# Patient Record
Sex: MALE | Race: BLACK OR AFRICAN AMERICAN | URBAN - METROPOLITAN AREA
[De-identification: names, ages, dates, MRNs, and addresses within clinical notes are randomized per-mention and may not be internally consistent; named-entity substitution may affect disease eponyms.]

---

## 2017-01-05 RX ORDER — PRAVASTATIN SODIUM 40 MG/1
TABLET ORAL
Qty: 90 TAB | Refills: 3 | Status: SHIPPED | OUTPATIENT
Start: 2017-01-05 | End: 2017-12-05 | Stop reason: SDUPTHER

## 2017-01-05 RX ORDER — HYDROCHLOROTHIAZIDE 12.5 MG/1
CAPSULE ORAL
Qty: 90 CAP | Refills: 3 | Status: SHIPPED | OUTPATIENT
Start: 2017-01-05 | End: 2017-12-05 | Stop reason: SDUPTHER

## 2017-03-09 ENCOUNTER — HOSPITAL ENCOUNTER (OUTPATIENT)
Dept: LAB | Age: 72
Discharge: HOME | End: 2017-03-09
Payer: MEDICARE

## 2017-03-09 ENCOUNTER — OFFICE VISIT (OUTPATIENT)
Dept: FAMILY MEDICINE CLINIC | Age: 72
End: 2017-03-09

## 2017-03-09 VITALS
WEIGHT: 179 LBS | HEART RATE: 86 BPM | DIASTOLIC BLOOD PRESSURE: 80 MMHG | OXYGEN SATURATION: 98 % | HEIGHT: 65 IN | BODY MASS INDEX: 29.82 KG/M2 | SYSTOLIC BLOOD PRESSURE: 128 MMHG | RESPIRATION RATE: 16 BRPM | TEMPERATURE: 97.6 F

## 2017-03-09 DIAGNOSIS — E11.9 DIABETES MELLITUS WITHOUT COMPLICATION (HCC): ICD-10-CM

## 2017-03-09 DIAGNOSIS — E78.00 HYPERCHOLESTEROLEMIA: ICD-10-CM

## 2017-03-09 DIAGNOSIS — I10 HTN (HYPERTENSION), BENIGN: ICD-10-CM

## 2017-03-09 DIAGNOSIS — E11.9 DIABETES MELLITUS WITHOUT COMPLICATION (HCC): Primary | ICD-10-CM

## 2017-03-09 LAB
ALT SERPL-CCNC: 56 U/L (ref 16–61)
ANION GAP BLD CALC-SCNC: 9 MMOL/L (ref 3–18)
AST SERPL W P-5'-P-CCNC: 26 U/L (ref 15–37)
BUN SERPL-MCNC: 19 MG/DL (ref 7–18)
BUN/CREAT SERPL: 19 (ref 12–20)
CALCIUM SERPL-MCNC: 10 MG/DL (ref 8.5–10.1)
CHLORIDE SERPL-SCNC: 104 MMOL/L (ref 100–108)
CHOLEST SERPL-MCNC: 159 MG/DL
CO2 SERPL-SCNC: 27 MMOL/L (ref 21–32)
CREAT SERPL-MCNC: 1.01 MG/DL (ref 0.6–1.3)
EST. AVERAGE GLUCOSE BLD GHB EST-MCNC: 148 MG/DL
GLUCOSE SERPL-MCNC: 120 MG/DL (ref 74–99)
HBA1C MFR BLD: 6.8 % (ref 4.2–5.6)
HDLC SERPL-MCNC: 53 MG/DL (ref 40–60)
HDLC SERPL: 3 {RATIO} (ref 0–5)
LDLC SERPL CALC-MCNC: 87.8 MG/DL (ref 0–100)
LIPID PROFILE,FLP: NORMAL
POTASSIUM SERPL-SCNC: 4.3 MMOL/L (ref 3.5–5.5)
SODIUM SERPL-SCNC: 140 MMOL/L (ref 136–145)
TRIGL SERPL-MCNC: 91 MG/DL (ref ?–150)
VLDLC SERPL CALC-MCNC: 18.2 MG/DL

## 2017-03-09 PROCEDURE — 80048 BASIC METABOLIC PNL TOTAL CA: CPT | Performed by: FAMILY MEDICINE

## 2017-03-09 PROCEDURE — 84450 TRANSFERASE (AST) (SGOT): CPT | Performed by: FAMILY MEDICINE

## 2017-03-09 PROCEDURE — 83036 HEMOGLOBIN GLYCOSYLATED A1C: CPT | Performed by: FAMILY MEDICINE

## 2017-03-09 PROCEDURE — 84460 ALANINE AMINO (ALT) (SGPT): CPT | Performed by: FAMILY MEDICINE

## 2017-03-09 PROCEDURE — 36415 COLL VENOUS BLD VENIPUNCTURE: CPT | Performed by: FAMILY MEDICINE

## 2017-03-09 PROCEDURE — 80061 LIPID PANEL: CPT | Performed by: FAMILY MEDICINE

## 2017-03-09 RX ORDER — IRBESARTAN 300 MG/1
TABLET ORAL
Qty: 90 TAB | Refills: 3 | Status: SHIPPED | OUTPATIENT
Start: 2017-03-09 | End: 2017-12-05 | Stop reason: SDUPTHER

## 2017-03-09 NOTE — PROGRESS NOTES
1. Have you been to the ER, urgent care clinic since your last visit? Hospitalized since your last visit?no    2. Have you seen or consulted any other health care providers outside of the [address] [place] [address] since your last visit? Include any pap smears or colon screening.  No

## 2017-03-09 NOTE — MR AVS SNAPSHOT
Visit Information Date & Time Provider Department Dept. Phone Encounter #  
 3/9/2017 11:00 AM Jose ElizondoKaran [id number] Follow-up Instructions Return in about 4 months (around 7/9/2017) for medicare well. Upcoming Health Maintenance Date Due Pneumococcal 65+ Low/Medium Risk (1 of 2 - PCV13) 6/22/2017* EYE EXAM RETINAL OR DILATED Q1 6/22/2017* DTaP/Tdap/Td series (1 - Tdap) 6/23/2017* MEDICARE YEARLY EXAM 7/19/2017* FOOT EXAM Q1 4/19/2017 MICROALBUMIN Q1 4/19/2017 LIPID PANEL Q1 8/30/2017 HEMOGLOBIN A1C Q6M 9/9/2017 GLAUCOMA SCREENING Q2Y 11/4/2017 COLONOSCOPY 4/1/2025 *Topic was postponed. The date shown is not the original due date. Allergies as of 3/9/2017  Review Complete On: 3/9/2017 By: Jose Elizondo MD  
 No Known Allergies Current Immunizations  Never Reviewed No immunizations on file. Not reviewed this visit You Were Diagnosed With   
  
 Codes Comments Diabetes mellitus without [facility]    -  Primary ICD-10-CM: E11.9 ICD-9-CM: 250.00 Hypercholesterolemia     ICD-10-CM: E78.00 ICD-9-CM: 272.0   
 HTN (hypertension), benign     ICD-10-CM: I10 
ICD-9-CM: 401.1 Vitals BP Pulse Temp Resp Height(growth percentile) Weight(growth percentile) 128/80 (BP 1 Location: Left arm, BP Patient Position: Sitting) 86 97.6 °F (36.4 °C) (Oral) 16 5' 5\" (1.651 m) 179 lb (81.2 kg) SpO2 BMI Smoking Status 98% 29.79 kg/m2 Current Every Day Smoker BMI and BSA Data Body Mass Index Body Surface Area  
 29.79 kg/m 2 1.93 m 2 Preferred Pharmacy Pharmacy Name Phone [id number], [address] [place] [address],4Th Floor [facility] [phone] Your Updated Medication List  
  
   
This list is accurate as of: 3/9/17 12:03 PM.  Always use your most recent med list.  
  
  
  
  
 hydroCHLOROthiazide 12.5 mg capsule Commonly known as:  Norval [place] TAKE 1 CAPSULE BY MOUTH DAILY  
  
 irbesartan 300 mg tablet Commonly known as:  AVAPRO TAKE 1 TABLET BY MOUTH DAILY. MULTI-VITAMIN PO Take  by mouth.  
  
 pravastatin 40 mg tablet Commonly known as:  PRAVACHOL  
TAKE 1 TABLET BY MOUTH DAILY Prescriptions Sent to Pharmacy Refills  
 irbesartan (AVAPRO) 300 mg tablet 3 Sig: TAKE 1 TABLET BY MOUTH DAILY. Class: Normal  
 [id number],4Th Floor [address] #: [phone] Follow-up Instructions Return in about 4 months (around 7/9/2017) for medicare well. Patient Instructions Diabetes Foot Health: Care Instructions Your Care Instructions When you have diabetes, your feet need extra care and attention. Diabetes can damage the nerve endings and blood vessels in your feet, making you less likely to notice when your feet are injured. Diabetes also limits your body's ability to fight infection and get blood to areas that need it. If you get a minor foot injury, it could become an ulcer or a serious infection. With good foot care, you can prevent most of these problems. Caring for your feet can be quick and easy. Most of the care can be done when you are bathing or getting ready for bed. Follow-up care is a key part of your treatment and safety. Be sure to make and go to all appointments, and call your doctor if you are having problems. Its also a good idea to know your test results and keep a list of the medicines you take. How can you care for yourself at home? · Keep your blood sugar close to normal by watching what and how much you eat, monitoring blood sugar, taking medicines if prescribed, and getting regular exercise. · Do not smoke. Smoking affects blood flow and can make foot problems worse.  If you need help quitting, talk to your doctor about stop-smoking programs and medicines. These can increase your chances of quitting for good. · Eat a diet that is low in fats. High fat intake can cause fat to build up in your blood vessels and decrease blood flow. · Inspect your feet daily for blisters, cuts, cracks, or sores. If you cannot see well, use a mirror or have someone help you. · Take care of your feet: 
[facility] your feet every day. Use warm (not hot) water. Check the water temperature with your wrists or other part of your body, not your feet. ¨ Dry your feet well. Pat them dry. Do not rub the skin on your feet too hard. Dry well between your toes. If the skin on your feet stays moist, bacteria or a fungus can grow, which can lead to infection. ¨ Keep your skin soft. Use moisturizing skin cream to keep the skin on your feet soft and prevent calluses and cracks. But do not put the cream between your toes, and stop using any cream that causes a rash. ¨ Clean underneath your toenails carefully. Do not use a sharp object to clean underneath your toenails. Use the blunt end of a nail file or other rounded tool. ¨ Trim and file your toenails straight across to prevent ingrown toenails. Use a nail clipper, not scissors. Use an emery board to smooth the edges. · Change socks daily. Socks without seams are best, because seams often rub the feet. You can find socks for people with diabetes from specialty catalogs. · Look inside your shoes every day for things like gravel or torn linings, which could cause blisters or sores. · Buy shoes that fit well: 
¨ Look for shoes that have plenty of space around the toes. This helps prevent bunions and blisters. ¨ Try on shoes while wearing the kind of socks you will usually wear with the shoes. ¨ Avoid plastic shoes. They may rub your feet and cause blisters. Good shoes should be made of materials that are flexible and breathable, such as leather or cloth. ¨ Break in new shoes slowly by wearing them for no more than an hour a day for several days. Take extra time to check your feet for red areas, blisters, or other problems after you wear new shoes. · Do not go barefoot. Do not wear sandals, and do not wear shoes with very thin soles. Thin soles are easy to puncture. They also do not protect your feet from hot pavement or cold weather. · Have your doctor check your feet during each visit. If you have a foot problem, see your doctor. Do not try to treat an early foot problem at home. Home remedies or treatments that you can buy without a prescription (such as corn removers) can be harmful. · Always get early treatment for foot problems. A minor irritation can lead to a major problem if not properly cared for early. When should you call for help? Call your doctor now or seek immediate medical care if: 
· You have a foot sore, an ulcer or break in the skin that is not healing after 4 days, bleeding corns or calluses, or an ingrown toenail. · You have blue or black areas, which can mean bruising or blood flow problems. · You have peeling skin or tiny blisters between your toes or cracking or oozing of the skin. · You have a fever for more than 24 hours and a foot sore. · You have new numbness or tingling in your feet that does not go away after you move your feet or change positions. · You have unexplained or unusual swelling of the foot or ankle. Watch closely for changes in your health, and be sure to contact your doctor if: 
· You cannot do proper foot care. Where can you learn more? Go to http://carleen-harjinder.info/. Enter A739 in the search box to learn more about \"Diabetes Foot Health: Care Instructions. \" Current as of: May 23, 2016 Content Version: 11.1 © 8461-1534 [employer], [employer].  Care instructions adapted under license by [employer] (which disclaims liability or warranty for this information). If you have questions about a medical condition or this instruction, always ask your healthcare professional. [address] any warranty or liability for your use of this information. Introducing [facility] SERVICES! [employer] introduces [employer] patient portal. Now you can access parts of your medical record, email your doctor's office, and request medication refills online. 1. In your internet browser, go to [email or web address] 2. Click on the First Time User? Click Here link in the Sign In box. You will see the New Member Sign Up page. 3. Enter your [employer] Access Code exactly as it appears below. You will not need to use this code after youve completed the sign-up process. If you do not sign up before the expiration date, you must request a new code. · [employer] Access Code: [email or web address] Expires: 6/7/2017 12:01 PM 
 
4. Enter the last four digits of your Social Security Number (xxxx) and Date of Birth (mm/dd/yyyy) as indicated and click Submit. You will be taken to the next sign-up page. 5. Create a [employer] ID. This will be your [employer] login ID and cannot be changed, so think of one that is secure and easy to remember. 6. Create a [employer] password. You can change your password at any time. 7. Enter your Password Reset Question and Answer. This can be used at a later time if you forget your password. 8. Enter your e-mail address. You will receive e-mail notification when new information is available in [address] 9. Click Sign Up. You can now view and download portions of your medical record. 10. Click the Download Summary menu link to download a portable copy of your medical information. If you have questions, please visit the Frequently Asked Questions section of the [employer] website. Remember, [employer] is NOT to be used for urgent needs. For medical emergencies, dial 911. Now available from your iPhone and Android! Please provide this summary of care documentation to your next provider. Your primary care clinician is listed as [facility]. If you have any questions after today's visit, please call [phone].

## 2017-03-09 NOTE — PROGRESS NOTES
HISTORY OF PRESENT ILLNESS  Torito Santana is a 66 y.o. male. HPI  Patient is here today for evaluation and treatment of: Hypertension/Cholesterol problem    HTN: Patient is compliant with medication and does not monitor blood pressure readings at home. Patient  does work on diet and exercise. [facility] CHOL: Patient is compliant with medication and is fasting today. Diet reported above. Continues on pravachol    Pt also has DM that is diet controlled. Last A1c was controlled. He has no new complaints today      Current Outpatient Prescriptions:     irbesartan (AVAPRO) 300 mg tablet, TAKE 1 TABLET BY MOUTH DAILY. , Disp: 90 Tab, Rfl: 3    pravastatin (PRAVACHOL) 40 mg tablet, TAKE 1 TABLET BY MOUTH DAILY, Disp: 90 Tab, Rfl: 3    hydroCHLOROthiazide (MICROZIDE) 12.5 mg capsule, TAKE 1 CAPSULE BY MOUTH DAILY, Disp: 90 Cap, Rfl: 3    MULTIVITAMINS (MULTI-VITAMIN PO), Take  by mouth., Disp: , Rfl:       PMH,  Meds, Allergies, Family History, Social history reviewed      Review of Systems   Constitutional: Negative for weight loss. Cardiovascular: Negative for chest pain and leg swelling.        Physical Exam   Visit Vitals    /80 (BP 1 Location: Left arm, BP Patient Position: Sitting)    Pulse 86    Temp 97.6 °F (36.4 °C) (Oral)    Resp 16    Ht 5' 5\" (1.651 m)    Wt 179 lb (81.2 kg)    SpO2 98%    BMI 29.79 kg/m2   General appearance: alert, cooperative, no distress, appears stated age  Neck: supple, symmetrical, trachea midline, no adenopathy, thyroid: not enlarged, symmetric, no tenderness/mass/nodules, no carotid bruit and no JVD  Lungs: clear to auscultation bilaterally  Heart: regular rate and rhythm, S1, S2 normal, no murmur, click, rub or gallop  Extremities: extremities normal, atraumatic, no cyanosis or edema    Lab Results   Component Value Date/Time    Cholesterol, total 133 08/30/2016 11:35 AM    HDL Cholesterol 50 08/30/2016 11:35 AM    LDL, calculated 70.2 08/30/2016 11:35 AM    VLDL, calculated 12.8 08/30/2016 11:35 AM    Triglyceride 64 08/30/2016 11:35 AM    CHOL/HDL Ratio 2.7 08/30/2016 11:35 AM     Lab Results   Component Value Date/Time    Glucose 120 08/30/2016 11:35 AM     Lab Results   Component Value Date/Time    Hemoglobin A1c 6.7 08/30/2016 11:35 AM         ASSESSMENT and PLAN    ICD-10-CM ICD-9-CM    1. Diabetes mellitus without complication (HCC) K49.5 250.00    2. Hypercholesterolemia E78.00 272.0    3. HTN (hypertension), benign I10 401.1      As above,   above all stable unless otherwise noted  Labs as ordered  Continue current meds as ordered  Follow-up Disposition:  Return in about 4 months (around 7/9/2017) for medicare well. An After Visit Summary was printed and given to the patient. This has been fully explained to the patient, who indicates understanding.

## 2017-03-09 NOTE — PATIENT INSTRUCTIONS
Diabetes Foot Health: Care Instructions  Your Care Instructions    When you have diabetes, your feet need extra care and attention. Diabetes can damage the nerve endings and blood vessels in your feet, making you less likely to notice when your feet are injured. Diabetes also limits your body's ability to fight infection and get blood to areas that need it. If you get a minor foot injury, it could become an ulcer or a serious infection. With good foot care, you can prevent most of these problems. Caring for your feet can be quick and easy. Most of the care can be done when you are bathing or getting ready for bed. Follow-up care is a key part of your treatment and safety. Be sure to make and go to all appointments, and call your doctor if you are having problems. Its also a good idea to know your test results and keep a list of the medicines you take. How can you care for yourself at home? · Keep your blood sugar close to normal by watching what and how much you eat, monitoring blood sugar, taking medicines if prescribed, and getting regular exercise. · Do not smoke. Smoking affects blood flow and can make foot problems worse. If you need help quitting, talk to your doctor about stop-smoking programs and medicines. These can increase your chances of quitting for good. · Eat a diet that is low in fats. High fat intake can cause fat to build up in your blood vessels and decrease blood flow. · Inspect your feet daily for blisters, cuts, cracks, or sores. If you cannot see well, use a mirror or have someone help you. · Take care of your feet:  [facility] your feet every day. Use warm (not hot) water. Check the water temperature with your wrists or other part of your body, not your feet. ¨ Dry your feet well. Pat them dry. Do not rub the skin on your feet too hard. Dry well between your toes. If the skin on your feet stays moist, bacteria or a fungus can grow, which can lead to infection.   ¨ Keep your skin soft. Use moisturizing skin cream to keep the skin on your feet soft and prevent calluses and cracks. But do not put the cream between your toes, and stop using any cream that causes a rash. ¨ Clean underneath your toenails carefully. Do not use a sharp object to clean underneath your toenails. Use the blunt end of a nail file or other rounded tool. ¨ Trim and file your toenails straight across to prevent ingrown toenails. Use a nail clipper, not scissors. Use an emery board to smooth the edges. · Change socks daily. Socks without seams are best, because seams often rub the feet. You can find socks for people with diabetes from specialty catalogs. · Look inside your shoes every day for things like gravel or torn linings, which could cause blisters or sores. · Buy shoes that fit well:  ¨ Look for shoes that have plenty of space around the toes. This helps prevent bunions and blisters. ¨ Try on shoes while wearing the kind of socks you will usually wear with the shoes. ¨ Avoid plastic shoes. They may rub your feet and cause blisters. Good shoes should be made of materials that are flexible and breathable, such as leather or cloth. ¨ Break in new shoes slowly by wearing them for no more than an hour a day for several days. Take extra time to check your feet for red areas, blisters, or other problems after you wear new shoes. · Do not go barefoot. Do not wear sandals, and do not wear shoes with very thin soles. Thin soles are easy to puncture. They also do not protect your feet from hot pavement or cold weather. · Have your doctor check your feet during each visit. If you have a foot problem, see your doctor. Do not try to treat an early foot problem at home. Home remedies or treatments that you can buy without a prescription (such as corn removers) can be harmful. · Always get early treatment for foot problems. A minor irritation can lead to a major problem if not properly cared for early.   When should you call for help? Call your doctor now or seek immediate medical care if:  · You have a foot sore, an ulcer or break in the skin that is not healing after 4 days, bleeding corns or calluses, or an ingrown toenail. · You have blue or black areas, which can mean bruising or blood flow problems. · You have peeling skin or tiny blisters between your toes or cracking or oozing of the skin. · You have a fever for more than 24 hours and a foot sore. · You have new numbness or tingling in your feet that does not go away after you move your feet or change positions. · You have unexplained or unusual swelling of the foot or ankle. Watch closely for changes in your health, and be sure to contact your doctor if:  · You cannot do proper foot care. Where can you learn more? Go to http://carleen-harjinder.info/. Enter A739 in the search box to learn more about \"Diabetes Foot Health: Care Instructions. \"  Current as of: May 23, 2016  Content Version: 11.1  © 3942-0490 [employer]. Care instructions adapted under license by [employer] (which disclaims liability or warranty for this information). If you have questions about a medical condition or this instruction, always ask your healthcare professional. [address] any warranty or liability for your use of this information.

## 2017-07-12 ENCOUNTER — OFFICE VISIT (OUTPATIENT)
Dept: FAMILY MEDICINE CLINIC | Age: 72
End: 2017-07-12

## 2017-07-12 VITALS
HEART RATE: 79 BPM | RESPIRATION RATE: 16 BRPM | BODY MASS INDEX: 30.32 KG/M2 | DIASTOLIC BLOOD PRESSURE: 64 MMHG | HEIGHT: 65 IN | OXYGEN SATURATION: 98 % | TEMPERATURE: 97.7 F | SYSTOLIC BLOOD PRESSURE: 124 MMHG | WEIGHT: 182 LBS

## 2017-07-12 DIAGNOSIS — Z00.00 ROUTINE GENERAL MEDICAL EXAMINATION AT A HEALTH CARE FACILITY: Primary | ICD-10-CM

## 2017-07-12 DIAGNOSIS — E11.9 DIABETES MELLITUS WITHOUT COMPLICATION (HCC): ICD-10-CM

## 2017-07-12 NOTE — PROGRESS NOTES
This is a Subsequent Medicare Annual Wellness Visit providing Personalized Prevention Plan Services (PPPS) (Performed 12 months after initial AWV and PPPS )    I have reviewed the patient's medical history in detail and updated the computerized patient record. Pt needs a right diabetic foot exam and urine microalbumin;   Pt declines N4H today for ubcertain reason; Last A1c was 6.8% ; advised to check this again while here; he declines. History     Past Medical History:   Diagnosis Date    Arthritis     hands    Cataract     left eye    Diabetes ([facility]     diet controlled    DM (diabetes mellitus) ([facility] 2/3/2010    HTN (hypertension), benign     Hypercholesterolemia     [facility]      Past Surgical History:   Procedure Laterality Date    CARDIAC SURG PROCEDURE UNLIST  1995    left fem/pop bypass    CARDIAC SURG PROCEDURE UNLIST  1997    occlusion bypass angiographic thrombolysis failed    CARDIAC SURG PROCEDURE UNLIST      s/p left illiac/pop    HX AMPUTATION      left leg aka prosthesis due to PVD     Current Outpatient Prescriptions   Medication Sig Dispense Refill    irbesartan (AVAPRO) 300 mg tablet TAKE 1 TABLET BY MOUTH DAILY. 90 Tab 3    pravastatin (PRAVACHOL) 40 mg tablet TAKE 1 TABLET BY MOUTH DAILY 90 Tab 3    hydroCHLOROthiazide (MICROZIDE) 12.5 mg capsule TAKE 1 CAPSULE BY MOUTH DAILY 90 Cap 3    MULTIVITAMINS (MULTI-VITAMIN PO) Take  by mouth.        No Known Allergies  Family History   Problem Relation Age of Onset    Hypertension Father      Social History   Substance Use Topics    Smoking status: Current Every Day Smoker     Types: Cigarettes    Smokeless tobacco: Never Used    Alcohol use No     Patient Active Problem List   Diagnosis Code    Gout M10.9    PVD (peripheral vascular disease) (HCC) I73.9    Hypercholesterolemia E78.00    Arthritis M19.90    Cataract H26.9    DM (diabetes mellitus) ([facility] E11.9    HTN (hypertension), benign I10    Advanced directives, counseling/discussion Z71.89       Depression Risk Factor Screening:     PHQ over the last two weeks 3/9/2017   Little interest or pleasure in doing things Not at all   Feeling down, depressed or hopeless Not at all   Total Score PHQ 2 0     Alcohol Risk Factor Screening: On any occasion during the past 3 months, have you had more than 4 drinks containing alcohol? No    Do you average more than 14 drinks per week? No        Functional Ability and Level of Safety:     Hearing Loss   none    Activities of Daily Living   Self-care. Requires assistance with: no ADLs    Fall Risk   Fall Risk Assessment, last 12 mths 7/12/2017   Able to walk? Yes   Fall in past 12 months? No     Abuse Screen   Patient is not abused    Review of Systems   A comprehensive review of systems was negative except for that written in the HPI. Physical Examination     Evaluation of Cognitive Function:  Mood/affect:  neutral  Appearance: age appropriate  Family member/caregiver input: none:      Visit Vitals    /64 (BP 1 Location: Left arm, BP Patient Position: Sitting)    Pulse 79    Temp 97.7 °F (36.5 °C) (Oral)    Resp 16    Ht 5' 5\" (1.651 m)    Wt 182 lb (82.6 kg)    SpO2 98%    BMI 30.29 kg/m2     General appearance: alert, cooperative, no distress, appears stated age  Lungs: clear to auscultation bilaterally  Heart: regular rate and rhythm, S1, S2 normal, no murmur, click, rub or gallop  Extremities: extremities normal, atraumatic, no cyanosis or edema  Sensory exam of the foot is normal, tested with the monofilament. Good pulses, no lesions or ulcers, good peripheral pulses.             Patient Care Team:  Radha Lama MD as PCP - Oral Becker MD (Podiatry)  Roselia Batres MD (Ophthalmology)    Advice/Referrals/Counseling   Education and counseling provided:  Are appropriate based on today's review and evaluation  End-of-Life planning (with patient's consent)      Assessment/Plan       ICD-10-CM ICD-9-CM    1. Routine general medical examination at a health care facility Z00.00 V70.0    2. Diabetes mellitus without complication ([facility]- for foot exam only E11.9 250.00  DIABETES FOOT EXAM   .  Pt is well ; stable  Declines some HM topics ; will get at a subsequent visit. Follow-up Disposition:  Return in about 4 months (around 11/12/2017) for dm/. An After Visit Summary was printed and given to the patient. This has been fully explained to the patient, who indicates understanding.

## 2017-07-12 NOTE — PATIENT INSTRUCTIONS

## 2017-07-12 NOTE — PROGRESS NOTES
1. Have you been to the ER, urgent care clinic since your last visit? Hospitalized since your last visit? No    2. Have you seen or consulted any other health care providers outside of the [address] [place] [address] since your last visit? Include any pap smears or colon screening.  Yes Where: ophthalmology - [facility] - Dr. Rui Browning

## 2017-07-24 ENCOUNTER — TELEPHONE (OUTPATIENT)
Dept: FAMILY MEDICINE CLINIC | Age: 72
End: 2017-07-24

## 2017-07-24 NOTE — TELEPHONE ENCOUNTER
Simi checking in on a medical necessity form faxed over on 7/18/17. Confirmed she will re-fax paperwork.

## 2017-12-05 ENCOUNTER — OFFICE VISIT (OUTPATIENT)
Dept: FAMILY MEDICINE CLINIC | Age: 72
End: 2017-12-05

## 2017-12-05 VITALS
DIASTOLIC BLOOD PRESSURE: 53 MMHG | HEART RATE: 87 BPM | TEMPERATURE: 98.1 F | HEIGHT: 65 IN | WEIGHT: 187 LBS | SYSTOLIC BLOOD PRESSURE: 128 MMHG | OXYGEN SATURATION: 96 % | BODY MASS INDEX: 31.16 KG/M2 | RESPIRATION RATE: 22 BRPM

## 2017-12-05 DIAGNOSIS — I73.9 PVD (PERIPHERAL VASCULAR DISEASE) (HCC): ICD-10-CM

## 2017-12-05 DIAGNOSIS — E11.59 TYPE 2 DIABETES MELLITUS WITH OTHER CIRCULATORY COMPLICATION, WITHOUT LONG-TERM CURRENT USE OF INSULIN (HCC): Primary | ICD-10-CM

## 2017-12-05 DIAGNOSIS — I10 HTN (HYPERTENSION), BENIGN: ICD-10-CM

## 2017-12-05 DIAGNOSIS — E78.00 HYPERCHOLESTEROLEMIA: ICD-10-CM

## 2017-12-05 RX ORDER — IRBESARTAN 300 MG/1
TABLET ORAL
Qty: 90 TAB | Refills: 3 | Status: SHIPPED | OUTPATIENT
Start: 2017-12-05 | End: 2018-12-11 | Stop reason: SDUPTHER

## 2017-12-05 RX ORDER — HYDROCHLOROTHIAZIDE 12.5 MG/1
CAPSULE ORAL
Qty: 90 CAP | Refills: 3 | Status: SHIPPED | OUTPATIENT
Start: 2017-12-05 | End: 2018-12-11 | Stop reason: SDUPTHER

## 2017-12-05 RX ORDER — PRAVASTATIN SODIUM 40 MG/1
TABLET ORAL
Qty: 90 TAB | Refills: 3 | Status: SHIPPED | OUTPATIENT
Start: 2017-12-05 | End: 2018-12-11 | Stop reason: SDUPTHER

## 2017-12-05 NOTE — PATIENT INSTRUCTIONS
Body Mass Index: Care Instructions  Your Care Instructions    Body mass index (BMI) can help you see if your weight is raising your risk for health problems. It uses a formula to compare how much you weigh with how tall you are. · A BMI lower than 18.5 is considered underweight. · A BMI between 18.5 and 24.9 is considered healthy. · A BMI between 25 and 29.9 is considered overweight. A BMI of 30 or higher is considered obese. If your BMI is in the normal range, it means that you have a lower risk for weight-related health problems. If your BMI is in the overweight or obese range, you may be at increased risk for weight-related health problems, such as high blood pressure, heart disease, stroke, arthritis or joint pain, and diabetes. If your BMI is in the underweight range, you may be at increased risk for health problems such as fatigue, lower protection (immunity) against illness, muscle loss, bone loss, hair loss, and hormone problems. BMI is just one measure of your risk for weight-related health problems. You may be at higher risk for health problems if you are not active, you eat an unhealthy diet, or you drink too much alcohol or use tobacco products. Follow-up care is a key part of your treatment and safety. Be sure to make and go to all appointments, and call your doctor if you are having problems. It's also a good idea to know your test results and keep a list of the medicines you take. How can you care for yourself at home? · Practice healthy eating habits. This includes eating plenty of fruits, vegetables, whole grains, lean protein, and low-fat dairy. · If your doctor recommends it, get more exercise. Walking is a good choice. Bit by bit, increase the amount you walk every day. Try for at least 30 minutes on most days of the week. · Do not smoke. Smoking can increase your risk for health problems. If you need help quitting, talk to your doctor about stop-smoking programs and medicines. These can increase your chances of quitting for good. · Limit alcohol to 2 drinks a day for men and 1 drink a day for women. Too much alcohol can cause health problems. If you have a BMI higher than 25  · Your doctor may do other tests to check your risk for weight-related health problems. This may include measuring the distance around your waist. A waist measurement of more than 40 inches in men or 35 inches in women can increase the risk of weight-related health problems. · Talk with your doctor about steps you can take to stay healthy or improve your health. You may need to make lifestyle changes to lose weight and stay healthy, such as changing your diet and getting regular exercise. If you have a BMI lower than 18.5  · Your doctor may do other tests to check your risk for health problems. · Talk with your doctor about steps you can take to stay healthy or improve your health. You may need to make lifestyle changes to gain or maintain weight and stay healthy, such as getting more healthy foods in your diet and doing exercises to build muscle. Where can you learn more? Go to http://carleen-harjinder.info/. Enter S176 in the search box to learn more about \"Body Mass Index: Care Instructions. \"  Current as of: October 13, 2016  Content Version: 11.4  © 7796-6333 Healthwise, Inc[employer]. Care instructions adapted under license by [employer] (which disclaims liability or warranty for this information). If you have questions about a medical condition or this instruction, always ask your healthcare professional. [address] any warranty or liability for your use of this information.

## 2017-12-05 NOTE — PROGRESS NOTES
1. Have you been to the ER, urgent care clinic since your last visit? Hospitalized since your last visit? No    2. Have you seen or consulted any other health care providers outside of the [address] [place] [address] since your last visit? Include any pap smears or colon screening.  No

## 2017-12-05 NOTE — PROGRESS NOTES
HISTORY OF PRESENT ILLNESS  Steffanie Carballo is a 67 y.o. male. HPI   Patient is here today for evaluation and treatment of; Diabetes. Diabetes:  Pt is on meds as listed below; last A1c was controlled. Pt is not on meds for DM; Pt has PVD;  Pt has a left AKA with prosthesis. Pts BP is stable; he is on avapro and microzide. He is on pravachol for cholesterol; He has no new complaints. Lab Results   Component Value Date/Time    Hemoglobin A1c 6.8 03/09/2017 12:07 PM       Current Outpatient Prescriptions:     pravastatin (PRAVACHOL) 40 mg tablet, TAKE 1 TABLET BY MOUTH DAILY, Disp: 90 Tab, Rfl: 3    hydroCHLOROthiazide (MICROZIDE) 12.5 mg capsule, TAKE 1 CAPSULE BY MOUTH DAILY, Disp: 90 Cap, Rfl: 3    irbesartan (AVAPRO) 300 mg tablet, TAKE 1 TABLET BY MOUTH DAILY. , Disp: 90 Tab, Rfl: 3    MULTIVITAMINS (MULTI-VITAMIN PO), Take  by mouth., Disp: , Rfl:     Review of Systems   Constitutional: Negative for chills and fever. Cardiovascular: Negative for chest pain and leg swelling. Physical Exam   Constitutional: He appears well-developed and well-nourished. No distress. Cardiovascular: Normal rate. Exam reveals no gallop and no friction rub. No murmur heard. Pulmonary/Chest: Breath sounds normal. No respiratory distress. He has no wheezes. Musculoskeletal:   RLE no edema;  LLE with prosthesis. Nursing note and vitals reviewed. ASSESSMENT and PLAN    ICD-10-CM ICD-9-CM    1. Type 2 diabetes mellitus with other circulatory complication, without long-term current use of insulin ([facility]) E11.59 250.70 MICROALBUMIN, UR, RAND W/ MICROALBUMIN/CREA RATIO      HEMOGLOBIN A1C WITH EAG   2. PVD (peripheral vascular disease) ([facility]) I73.9 443.9    3.  Hypercholesterolemia E78.00 272.0 LIPID PANEL      AST      ALT   4. HTN (hypertension), benign L00 226.4 METABOLIC PANEL, BASIC       As above,  above all stable unless otherwise noted   Labs as ordered  Continue current meds as ordered    Follow-up Disposition:  Return in about 4 months (around 4/5/2018) for htn/chol/dm.

## 2017-12-05 NOTE — MR AVS SNAPSHOT
Visit Information Date & Time Provider Department Dept. Phone Encounter #  
 12/5/2017 10:00 AM Jason Alfredo[address] [place] [facility] [id number] Follow-up Instructions Return in about 4 months (around 4/5/2018) for htn/chol/dm. Follow-up and Disposition History Upcoming Health Maintenance Date Due  
 LIPID PANEL Q1 3/9/2018 EYE EXAM RETINAL OR DILATED Q1 5/17/2018 HEMOGLOBIN A1C Q6M 6/5/2018 Pneumococcal 65+ Low/Medium Risk (2 of 2 - PPSV23) 7/12/2018 FOOT EXAM Q1 7/12/2018 MEDICARE YEARLY EXAM 7/13/2018 MICROALBUMIN Q1 12/5/2018 GLAUCOMA SCREENING Q2Y 5/17/2019 COLONOSCOPY 4/1/2025 DTaP/Tdap/Td series (2 - Td) 7/12/2027 Allergies as of 12/5/2017  Review Complete On: 12/5/2017 By: Ashley Mosqueda LPN No Known Allergies Current Immunizations  Never Reviewed No immunizations on file. Not reviewed this visit You Were Diagnosed With   
  
 Codes Comments Type 2 diabetes mellitus with other circulatory complication, without long-term current use of insulin (HCC)    -  Primary ICD-10-CM: E11.59 
ICD-9-CM: 250.70 [facility]peripheral vascular disease) [facility]     ICD-10-CM: I73.9 ICD-9-CM: 443.9 Hypercholesterolemia     ICD-10-CM: E78.00 ICD-9-CM: 272.0   
 HTN (hypertension), benign     ICD-10-CM: I10 
ICD-9-CM: 401.1 Vitals BP Pulse Temp Resp Height(growth percentile) Weight(growth percentile) 128/53 (BP 1 Location: Left arm, BP Patient Position: Sitting) 87 98.1 °F (36.7 °C) (Oral) 22 5' 5\" (1.651 m) 187 lb (84.8 kg) SpO2 BMI Smoking Status 96% 31.12 kg/m2 Current Every Day Smoker BMI and BSA Data Body Mass Index Body Surface Area  
 31.12 kg/m 2 1.97 m 2 Preferred Pharmacy Pharmacy Name Phone [id number] [address] [facility] [phone] Your Updated Medication List  
  
   
 This list is accurate as of: 12/5/17 10:18 AM.  Always use your most recent med list.  
  
  
  
  
 hydroCHLOROthiazide 12.5 mg capsule Commonly known as:  [place] TAKE 1 CAPSULE BY MOUTH DAILY  
  
 irbesartan 300 mg tablet Commonly known as:  AVAPRO TAKE 1 TABLET BY MOUTH DAILY. MULTI-VITAMIN PO Take  by mouth.  
  
 pravastatin 40 mg tablet Commonly known as:  PRAVACHOL  
TAKE 1 TABLET BY MOUTH DAILY Prescriptions Sent to Pharmacy Refills  
 pravastatin (PRAVACHOL) 40 mg tablet 3 Sig: TAKE 1 TABLET BY MOUTH DAILY Class: Normal  
 Pharmacy: [facility] #[id number] [address] #: [phone]  
 hydroCHLOROthiazide (MICROZIDE) 12.5 mg capsule 3 Sig: TAKE 1 CAPSULE BY MOUTH DAILY Class: Normal  
 Pharmacy: [facility]/pharmacy #[id number] [address] Ph #: [phone]  
 irbesartan (AVAPRO) 300 mg tablet 3 Sig: TAKE 1 TABLET BY MOUTH DAILY. Class: Normal  
 Pharmacy: [facility]/pharmacy [address] [place] [address],[address] [place] [address] Ph #: [phone] Follow-up Instructions Return in about 4 months (around 4/5/2018) for htn/chol/dm. To-Do List   
 12/05/2017 Lab:  ALT   
  
 12/05/2017 Lab:  AST   
  
 12/05/2017 Lab:  HEMOGLOBIN A1C WITH EAG   
  
 12/05/2017 Lab:  LIPID PANEL   
  
 12/05/2017 Lab:  METABOLIC PANEL, BASIC   
  
 12/05/2017 Lab:  MICROALBUMIN, UR, RAND W/ MICROALBUMIN/CREA RATIO Patient Instructions Body Mass Index: Care Instructions Your Care Instructions Body mass index (BMI) can help you see if your weight is raising your risk for health problems. It uses a formula to compare how much you weigh with how tall you are. · A BMI lower than 18.5 is considered underweight. · A BMI between 18.5 and 24.9 is considered healthy. · A BMI between 25 and 29.9 is considered overweight. A BMI of 30 or higher is considered obese. If your BMI is in the normal range, it means that you have a lower risk for weight-related health problems. If your BMI is in the overweight or obese range, you may be at increased risk for weight-related health problems, such as high blood pressure, heart disease, stroke, arthritis or joint pain, and diabetes. If your BMI is in the underweight range, you may be at increased risk for health problems such as fatigue, lower protection (immunity) against illness, muscle loss, bone loss, hair loss, and hormone problems. BMI is just one measure of your risk for weight-related health problems. You may be at higher risk for health problems if you are not active, you eat an unhealthy diet, or you drink too much alcohol or use tobacco products. Follow-up care is a key part of your treatment and safety. Be sure to make and go to all appointments, and call your doctor if you are having problems. It's also a good idea to know your test results and keep a list of the medicines you take. How can you care for yourself at home? · Practice healthy eating habits. This includes eating plenty of fruits, vegetables, whole grains, lean protein, and low-fat dairy. · If your doctor recommends it, get more exercise. Walking is a good choice. Bit by bit, increase the amount you walk every day. Try for at least 30 minutes on most days of the week. · Do not smoke. Smoking can increase your risk for health problems. If you need help quitting, talk to your doctor about stop-smoking programs and medicines. These can increase your chances of quitting for good. · Limit alcohol to 2 drinks a day for men and 1 drink a day for women. Too much alcohol can cause health problems. If you have a BMI higher than 25 · Your doctor may do other tests to check your risk for weight-related health problems.  This may include measuring the distance around your waist. A waist measurement of more than 40 inches in men or 35 inches in women can increase the risk of weight-related health problems. · Talk with your doctor about steps you can take to stay healthy or improve your health. You may need to make lifestyle changes to lose weight and stay healthy, such as changing your diet and getting regular exercise. If you have a BMI lower than 18.5 · Your doctor may do other tests to check your risk for health problems. · Talk with your doctor about steps you can take to stay healthy or improve your health. You may need to make lifestyle changes to gain or maintain weight and stay healthy, such as getting more healthy foods in your diet and doing exercises to build muscle. Where can you learn more? Go to http://carleen-harjinder.info/. Enter S176 in the search box to learn more about \"Body Mass Index: Care Instructions. \" Current as of: October 13, 2016 Content Version: 11.4 © 5468-2889 [employer]. Care instructions adapted under license by [employer] (which disclaims liability or warranty for this information). If you have questions about a medical condition or this instruction, always ask your healthcare professional. [address] any warranty or liability for your use of this information. Introducing [facility]! Sean Worley introduces [facility] patient portal. Now you can access parts of your medical record, email your doctor's office, and request medication refills online. 1. In your internet browser, go to [email or web address] 2. Click on the First Time User? Click Here link in the Sign In box. You will see the New Member Sign Up page. 3. Enter your [facility] Access Code exactly as it appears below. You will not need to use this code after youve completed the sign-up process. If you do not sign up before the expiration date, you must request a new code. · [facility] Access Code: [email or web address] Expires: 3/5/2018 10:16 AM 
 
 4. Enter the last four digits of your Social Security Number (xxxx) and Date of Birth (mm/dd/yyyy) as indicated and click Submit. You will be taken to the next sign-up page. 5. Create a [employer] ID. This will be your [employer] login ID and cannot be changed, so think of one that is secure and easy to remember. 6. Create a [employer] password. You can change your password at any time. 7. Enter your Password Reset Question and Answer. This can be used at a later time if you forget your password. 8. Enter your e-mail address. You will receive e-mail notification when new information is available in [address] 9. Click Sign Up. You can now view and download portions of your medical record. 10. Click the Download Summary menu link to download a portable copy of your medical information. If you have questions, please visit the Frequently Asked Questions section of the [employer] website. Remember, [employer] is NOT to be used for urgent needs. For medical emergencies, dial 911. Now available from your iPhone and Android! Please provide this summary of care documentation to your next provider. Your primary care clinician is listed as [address]. If you have any questions after today's visit, please call [phone].

## 2018-01-15 DIAGNOSIS — I10 HTN (HYPERTENSION), BENIGN: ICD-10-CM

## 2018-01-15 DIAGNOSIS — E11.59 TYPE 2 DIABETES MELLITUS WITH OTHER CIRCULATORY COMPLICATION, WITHOUT LONG-TERM CURRENT USE OF INSULIN (HCC): ICD-10-CM

## 2018-01-15 DIAGNOSIS — E78.00 HYPERCHOLESTEROLEMIA: ICD-10-CM

## 2018-04-03 ENCOUNTER — HOSPITAL ENCOUNTER (OUTPATIENT)
Dept: LAB | Age: 73
Discharge: HOME | End: 2018-04-03
Payer: MEDICARE

## 2018-04-03 ENCOUNTER — OFFICE VISIT (OUTPATIENT)
Dept: FAMILY MEDICINE CLINIC | Age: 73
End: 2018-04-03

## 2018-04-03 VITALS
HEART RATE: 71 BPM | BODY MASS INDEX: 30.49 KG/M2 | TEMPERATURE: 98.3 F | SYSTOLIC BLOOD PRESSURE: 126 MMHG | OXYGEN SATURATION: 95 % | HEIGHT: 65 IN | WEIGHT: 183 LBS | DIASTOLIC BLOOD PRESSURE: 77 MMHG | RESPIRATION RATE: 16 BRPM

## 2018-04-03 DIAGNOSIS — E11.9 DIABETES MELLITUS WITHOUT COMPLICATION (HCC): ICD-10-CM

## 2018-04-03 DIAGNOSIS — E78.00 HYPERCHOLESTEROLEMIA: ICD-10-CM

## 2018-04-03 DIAGNOSIS — E11.9 DIABETES MELLITUS WITHOUT COMPLICATION (HCC): Primary | ICD-10-CM

## 2018-04-03 DIAGNOSIS — I10 HTN (HYPERTENSION), BENIGN: ICD-10-CM

## 2018-04-03 LAB
ALT SERPL-CCNC: 37 U/L (ref 16–61)
ANION GAP SERPL CALC-SCNC: 7 MMOL/L (ref 3–18)
AST SERPL-CCNC: 21 U/L (ref 15–37)
BUN SERPL-MCNC: 17 MG/DL (ref 7–18)
BUN/CREAT SERPL: 19 (ref 12–20)
CALCIUM SERPL-MCNC: 10 MG/DL (ref 8.5–10.1)
CHLORIDE SERPL-SCNC: 107 MMOL/L (ref 100–108)
CHOLEST SERPL-MCNC: 150 MG/DL
CO2 SERPL-SCNC: 27 MMOL/L (ref 21–32)
CREAT SERPL-MCNC: 0.9 MG/DL (ref 0.6–1.3)
EST. AVERAGE GLUCOSE BLD GHB EST-MCNC: 163 MG/DL
GLUCOSE SERPL-MCNC: 121 MG/DL (ref 74–99)
HBA1C MFR BLD: 7.3 % (ref 4.2–5.6)
HDLC SERPL-MCNC: 46 MG/DL (ref 40–60)
HDLC SERPL: 3.3 {RATIO} (ref 0–5)
LDLC SERPL CALC-MCNC: 90 MG/DL (ref 0–100)
LIPID PROFILE,FLP: NORMAL
POTASSIUM SERPL-SCNC: 4.3 MMOL/L (ref 3.5–5.5)
SODIUM SERPL-SCNC: 141 MMOL/L (ref 136–145)
TRIGL SERPL-MCNC: 70 MG/DL (ref ?–150)
VLDLC SERPL CALC-MCNC: 14 MG/DL

## 2018-04-03 PROCEDURE — 83036 HEMOGLOBIN GLYCOSYLATED A1C: CPT | Performed by: FAMILY MEDICINE

## 2018-04-03 PROCEDURE — 84460 ALANINE AMINO (ALT) (SGPT): CPT | Performed by: FAMILY MEDICINE

## 2018-04-03 PROCEDURE — 80048 BASIC METABOLIC PNL TOTAL CA: CPT | Performed by: FAMILY MEDICINE

## 2018-04-03 PROCEDURE — 84450 TRANSFERASE (AST) (SGOT): CPT | Performed by: FAMILY MEDICINE

## 2018-04-03 PROCEDURE — 36415 COLL VENOUS BLD VENIPUNCTURE: CPT | Performed by: FAMILY MEDICINE

## 2018-04-03 PROCEDURE — 80061 LIPID PANEL: CPT | Performed by: FAMILY MEDICINE

## 2018-04-03 NOTE — PROGRESS NOTES
HISTORY OF PRESENT ILLNESS  Andrews Barr is a 67 y.o. male. HPI  Patient is here today for evaluation and treatment of: Diabetes/Hypertension/Cholesterol problem    Diabetes:  Pt is managing this with diet and exercise. Hypertension:  BP is stable; He continues on microzide and avapro. Pt takes med daily and tolerates med well; Cholesterol:  Pt is on pravachol;  He tolerates med well; . Attempts a lower cholesterol diet. Current Outpatient Prescriptions:     pravastatin (PRAVACHOL) 40 mg tablet, TAKE 1 TABLET BY MOUTH DAILY, Disp: 90 Tab, Rfl: 3    hydroCHLOROthiazide (MICROZIDE) 12.5 mg capsule, TAKE 1 CAPSULE BY MOUTH DAILY, Disp: 90 Cap, Rfl: 3    irbesartan (AVAPRO) 300 mg tablet, TAKE 1 TABLET BY MOUTH DAILY. , Disp: 90 Tab, Rfl: 3    MULTIVITAMINS (MULTI-VITAMIN PO), Take  by mouth., Disp: , Rfl:       PMH,  Meds, Allergies, Family History, Social history reviewed    Review of Systems   Constitutional: Negative for chills and fever. Respiratory: Negative for shortness of breath and wheezing. Cardiovascular: Negative for chest pain and palpitations. Physical Exam   Constitutional: He appears well-developed and well-nourished. No distress. Cardiovascular: Normal rate and regular rhythm. Exam reveals no gallop and no friction rub. No murmur heard. Pulmonary/Chest: Breath sounds normal. No respiratory distress. He has no wheezes. He has no rales. Musculoskeletal: He exhibits no edema. Nursing note and vitals reviewed. ASSESSMENT and PLAN    ICD-10-CM ICD-9-CM    1. Diabetes mellitus without complication (HCC) N00.1 250.00 HEMOGLOBIN A1C WITH EAG   2.  Hypercholesterolemia E78.00 272.0 LIPID PANEL      AST      ALT   3. HTN (hypertension), benign J52 442.6 METABOLIC PANEL, BASIC       As above,   above all stable unless otherwise noted  Labs as ordered  Continue current meds as ordered  Follow-up Disposition:  Return in about 4 months (around 8/3/2018) for medicare well.  An After Visit Summary was printed and given to the patient. This has been fully explained to the patient, who indicates understanding.

## 2018-04-03 NOTE — PATIENT INSTRUCTIONS
Hemoglobin A1c: About This Test  What is it? Hemoglobin A1c is a blood test that checks your average blood sugar level over the past 2 to 3 months. This test also is called a glycohemoglobin test or an A1c test.  Why is this test done? The A1c test is done to check how well your diabetes has been controlled over the past 2 to 3 months. Your doctor can use this information to adjust your medicine and diabetes treatment, if needed. How can you prepare for the test?  You do not need to stop eating before you have an A1c test. This test can be done at any time during the day, even after a meal.  What happens during the test?  The health professional taking a sample of your blood will:  · Wrap an elastic band around your upper arm. This makes the veins below the band larger so it is easier to put a needle into the vein. · Clean the needle site with alcohol. · Put the needle into the vein. · Attach a tube to the needle to fill it with blood. · Remove the band from your arm when enough blood is collected. · Put a gauze pad or cotton ball over the needle site as the needle is removed. · Put pressure on the site and then put on a bandage. What else should you know about the test?  The test result is usually given as a percentage. The normal A1c is less than 5.7%. The A1c test result also can be used to find your estimated average glucose, or eAG. Your eAG and A1c show the same thing in two different ways. They both help you learn more about your average blood sugar range over the past 2 to 3 months. Where can you learn more? Go to http://carleen-harjinder.info/. Enter U216 in the search box to learn more about \"Hemoglobin A1c: About This Test.\"  Current as of: March 13, 2017  Content Version: 11.4  © 0286-2377 [employer]. Care instructions adapted under license by [employer] (which disclaims liability or warranty for this information).  If you have questions about a medical condition or this instruction, always ask your healthcare professional. [place] [address] any warranty or liability for your use of this information.

## 2018-04-03 NOTE — MR AVS SNAPSHOT
[address] [facility] [address] 
 
 
 [address] [address] [address] [facility] [address] 
[phone] Patient: Madison Richmond MRN:  [id number] Visit Information Date & Time Provider Department Dept. Phone Encounter #  
 4/3/2018 10:40 AM Barb Gutierres[address] [phone] [id number] Follow-up Instructions Return in about 4 months (around 8/3/2018) for medicare well. Upcoming Health Maintenance Date Due  
 EYE EXAM RETINAL OR DILATED Q1 5/17/2018 HEMOGLOBIN A1C Q6M 6/7/2018 Pneumococcal 65+ Low/Medium Risk (2 of 2 - PPSV23) 7/12/2018 FOOT EXAM Q1 7/12/2018 MEDICARE YEARLY EXAM 7/13/2018 MICROALBUMIN Q1 12/7/2018 LIPID PANEL Q1 12/7/2018 GLAUCOMA SCREENING Q2Y 5/17/2019 COLONOSCOPY 4/1/2025 DTaP/Tdap/Td series (2 - Td) 7/12/2027 Allergies as of 4/3/2018  Review Complete On: 4/3/2018 By: Matthew Kincaid LPN No Known Allergies Current Immunizations  Never Reviewed No immunizations on file. Not reviewed this visit You Were Diagnosed With   
  
 Codes Comments Diabetes mellitus without complication ([facility]    -  Primary ICD-10-CM: E11.9 ICD-9-CM: 250.00 Hypercholesterolemia     ICD-10-CM: E78.00 ICD-9-CM: 272.0   
 HTN (hypertension), benign     ICD-10-CM: I10 
ICD-9-CM: 401.1 Vitals BP Pulse Temp Resp Height(growth percentile) Weight(growth percentile) 126/77 (BP 1 Location: Right arm, BP Patient Position: Sitting) 71 98.3 °F (36.8 °C) (Oral) 16 5' 5\" (1.651 m) 183 lb (83 kg) SpO2 BMI Smoking Status 95% 30.45 kg/m2 Current Every Day Smoker BMI and BSA Data Body Mass Index Body Surface Area  
 30.45 kg/m 2 1.95 m 2 Preferred Pharmacy Pharmacy Name Phone [id number], [address] [facility] [facility] [phone] Your Updated Medication List  
  
   
 This list is accurate as of 4/3/18 11:44 AM.  Always use your most recent med list.  
  
  
  
  
 hydroCHLOROthiazide 12.5 mg capsule Commonly known as:  Ocie Samples TAKE 1 CAPSULE BY MOUTH DAILY  
  
 irbesartan 300 mg tablet Commonly known as:  AVAPRO TAKE 1 TABLET BY MOUTH DAILY. MULTI-VITAMIN PO Take  by mouth.  
  
 pravastatin 40 mg tablet Commonly known as:  PRAVACHOL  
TAKE 1 TABLET BY MOUTH DAILY Follow-up Instructions Return in about 4 months (around 8/3/2018) for medicare well. To-Do List   
 04/03/2018 Lab:  ALT   
  
 04/03/2018 Lab:  AST   
  
 04/03/2018 Lab:  HEMOGLOBIN A1C WITH EAG   
  
 04/03/2018 Lab:  LIPID PANEL   
  
 04/03/2018 Lab:  METABOLIC PANEL, BASIC Patient Instructions Hemoglobin A1c: About This Test 
What is it? Hemoglobin A1c is a blood test that checks your average blood sugar level over the past 2 to 3 months. This test also is called a glycohemoglobin test or an A1c test. 
Why is this test done? The A1c test is done to check how well your diabetes has been controlled over the past 2 to 3 months. Your doctor can use this information to adjust your medicine and diabetes treatment, if needed. How can you prepare for the test? 
You do not need to stop eating before you have an A1c test. This test can be done at any time during the day, even after a meal. 
What happens during the test? 
The health professional taking a sample of your blood will: · Wrap an elastic band around your upper arm. This makes the veins below the band larger so it is easier to put a needle into the vein. · Clean the needle site with alcohol. · Put the needle into the vein. · Attach a tube to the needle to fill it with blood. · Remove the band from your arm when enough blood is collected. · Put a gauze pad or cotton ball over the needle site as the needle is removed. · Put pressure on the site and then put on a bandage. What else should you know about the test? 
The test result is usually given as a percentage. The normal A1c is less than 5.7%. The A1c test result also can be used to find your estimated average glucose, or eAG. Your eAG and A1c show the same thing in two different ways. They both help you learn more about your average blood sugar range over the past 2 to 3 months. Where can you learn more? Go to http://carleen-harjinder.info/. Enter U216 in the search box to learn more about \"Hemoglobin A1c: About This Test.\" Current as of: March 13, 2017 Content Version: 11.4 © 3626-1663 [employer] Care instructions adapted under license by [employer] (which disclaims liability or warranty for this information). If you have questions about a medical condition or this instruction, always ask your healthcare professional. [address] any warranty or liability for your use of this information. Introducing [facility]! Valentin Sy introduces Sleep Number patient portal. Now you can access parts of your medical record, email your doctor's office, and request medication refills online. 1. In your internet browser, go to [email or web address] 2. Click on the First Time User? Click Here link in the Sign In box. You will see the New Member Sign Up page. 3. Enter your Sleep Number Access Code exactly as it appears below. You will not need to use this code after youve completed the sign-up process. If you do not sign up before the expiration date, you must request a new code. · Sleep Number Access Code: [email or web address] Expires: 7/2/2018 11:44 AM 
 
4. Enter the last four digits of your Social Security Number (xxxx) and Date of Birth (mm/dd/yyyy) as indicated and click Submit. You will be taken to the next sign-up page. 5. Create a Sleep Number ID.  This will be your Sleep Number login ID and cannot be changed, so think of one that is secure and easy to remember. 6. Create a cooala [employer] your brands password. You can change your password at any time. 7. Enter your Password Reset Question and Answer. This can be used at a later time if you forget your password. 8. Enter your e-mail address. You will receive e-mail notification when new information is available in [address] 9. Click Sign Up. You can now view and download portions of your medical record. 10. Click the Download Summary menu link to download a portable copy of your medical information. If you have questions, please visit the Frequently Asked Questions section of the cooala [employer] your brands website. Remember, cooala [employer] is NOT to be used for urgent needs. For medical emergencies, dial 911. Now available from your iPhone and Android! Please provide this summary of care documentation to your next provider. Your primary care clinician is listed as [address]. If you have any questions after today's visit, please call [phone].

## 2018-04-03 NOTE — PROGRESS NOTES
1. Have you been to the ER, urgent care clinic since your last visit? Hospitalized since your last visit? No    2. Have you seen or consulted any other health care providers outside of the [facility] since your last visit? Include any pap smears or colon screening.  No

## 2018-04-03 NOTE — ACP (ADVANCE CARE PLANNING)
Advance Care Planning (ACP) Provider Conversation Snapshot    Date of ACP Conversation: 04/03/18  Persons included in Conversation:  patient  Length of ACP Conversation in minutes:  <16 minutes (Non-Billable)    Authorized Decision Maker (if patient is incapable of making informed decisions): This person is:   tbd          For Patients with Decision Making Capacity:   tbd    Conversation Outcomes / Follow-Up Plan:   Review and discuss advanced care plan with agents.

## 2018-08-08 ENCOUNTER — HOSPITAL ENCOUNTER (OUTPATIENT)
Dept: LAB | Age: 73
Discharge: HOME | End: 2018-08-08
Payer: MEDICARE

## 2018-08-08 ENCOUNTER — OFFICE VISIT (OUTPATIENT)
Dept: FAMILY MEDICINE CLINIC | Age: 73
End: 2018-08-08

## 2018-08-08 VITALS
OXYGEN SATURATION: 96 % | DIASTOLIC BLOOD PRESSURE: 72 MMHG | RESPIRATION RATE: 16 BRPM | SYSTOLIC BLOOD PRESSURE: 124 MMHG | WEIGHT: 177 LBS | HEART RATE: 73 BPM | TEMPERATURE: 98 F | HEIGHT: 65 IN | BODY MASS INDEX: 29.49 KG/M2

## 2018-08-08 DIAGNOSIS — E11.59 TYPE 2 DIABETES MELLITUS WITH OTHER CIRCULATORY COMPLICATION, WITHOUT LONG-TERM CURRENT USE OF INSULIN (HCC): ICD-10-CM

## 2018-08-08 DIAGNOSIS — Z00.00 MEDICARE ANNUAL WELLNESS VISIT, SUBSEQUENT: Primary | ICD-10-CM

## 2018-08-08 DIAGNOSIS — I73.9 PVD (PERIPHERAL VASCULAR DISEASE) (HCC): ICD-10-CM

## 2018-08-08 LAB
EST. AVERAGE GLUCOSE BLD GHB EST-MCNC: 146 MG/DL
HBA1C MFR BLD: 6.7 % (ref 4.2–5.6)

## 2018-08-08 PROCEDURE — 83036 HEMOGLOBIN GLYCOSYLATED A1C: CPT | Performed by: FAMILY MEDICINE

## 2018-08-08 PROCEDURE — 84450 TRANSFERASE (AST) (SGOT): CPT | Performed by: FAMILY MEDICINE

## 2018-08-08 PROCEDURE — 80048 BASIC METABOLIC PNL TOTAL CA: CPT | Performed by: FAMILY MEDICINE

## 2018-08-08 PROCEDURE — 84460 ALANINE AMINO (ALT) (SGPT): CPT | Performed by: FAMILY MEDICINE

## 2018-08-08 PROCEDURE — 80061 LIPID PANEL: CPT | Performed by: FAMILY MEDICINE

## 2018-08-08 PROCEDURE — 36415 COLL VENOUS BLD VENIPUNCTURE: CPT | Performed by: FAMILY MEDICINE

## 2018-08-08 NOTE — PROGRESS NOTES
This is the Subsequent Medicare Annual Wellness Exam, performed 12 months or more after the Initial AWV or the last Subsequent AWV    I have reviewed the patient's medical history in detail and updated the computerized patient record. Pt has been doing well. Pt advised that he needs to follow up with eye exam and foot exam. Pt has a left AKA  He is fasting  He declines immunizations today        History     Past Medical History:   Diagnosis Date    Arthritis     hands    Cataract     left eye    Diabetes ([facility]     diet controlled    DM (diabetes mellitus) ([facility] 2/3/2010    HTN (hypertension), benign     Hypercholesterolemia     [facility] vascular [facility]) [facility] 2/3/2010      Past Surgical History:   Procedure Laterality Date   [address]    left fem/pop bypass    CARDIAC SURG PROCEDURE UNLIST  1997    occlusion bypass angiographic thrombolysis failed    CARDIAC SURG PROCEDURE UNLIST      s/p left illiac/pop    HX AMPUTATION      left leg aka prosthesis due to PVD     Current Outpatient Prescriptions   Medication Sig Dispense Refill    pravastatin (PRAVACHOL) 40 mg tablet TAKE 1 TABLET BY MOUTH DAILY 90 Tab 3    hydroCHLOROthiazide (MICROZIDE) 12.5 mg capsule TAKE 1 CAPSULE BY MOUTH DAILY 90 Cap 3    irbesartan (AVAPRO) 300 mg tablet TAKE 1 TABLET BY MOUTH DAILY. 90 Tab 3    MULTIVITAMINS (MULTI-VITAMIN PO) Take  by mouth.        No Known Allergies  Family History   Problem Relation Age of Onset    Hypertension Father      Social History   Substance Use Topics    Smoking status: Current Every Day Smoker     Types: Cigarettes    Smokeless tobacco: Never Used    Alcohol use No     Patient Active Problem List   Diagnosis Code    Gout M10.9    PVD (peripheral vascular disease) (HCC) I73.9    Hypercholesterolemia E78.00    Arthritis M19.90    Cataract H26.9    DM (diabetes mellitus) ([facility] E11.9    HTN (hypertension), benign I10    Advanced directives, counseling/discussion Z71.89       Depression Risk Factor Screening:     PHQ over the last two weeks 4/3/2018   Little interest or pleasure in doing things Not at all   Feeling down, depressed, irritable, or hopeless Not at all   Total Score PHQ 2 0     Alcohol Risk Factor Screening: You do not drink alcohol or very rarely. Functional Ability and Level of Safety:   Hearing Loss  Hearing is good. Activities of Daily Living  The home contains: no safety equipment. Patient does total self care    Fall Risk  Fall Risk Assessment, last 12 mths 8/8/2018   Able to walk? Yes   Fall in past 12 months? No       Abuse Screen  Patient is not abused    Cognitive Screening   Evaluation of Cognitive Function:  Has your family/caregiver stated any concerns about your memory: no  Normal    Patient Care Team   Patient Care Team:  Bonny Hopper MD as PCP - Farooq Kay MD (Podiatry)  Morgan Randall MD (Ophthalmology)       PE:  Visit Vitals    /72 (BP 1 Location: Left arm, BP Patient Position: Sitting)    Pulse 73    Temp 98 °F (36.7 °C) (Oral)    Resp 16    Ht 5' 5\" (1.651 m)    Wt 177 lb (80.3 kg)    SpO2 96%    BMI 29.45 kg/m2     General appearance: alert, cooperative, no distress, appears stated age  Neck: supple, symmetrical, trachea midline, no adenopathy, thyroid: not enlarged, symmetric, no tenderness/mass/nodules, no carotid bruit and no JVD  Lungs: clear to auscultation bilaterally  Heart: regular rate and rhythm, S1, S2 normal, no murmur, click, rub or gallop  Extremities: left AKA with prosthesis      Assessment/Plan   Education and counseling provided:  Are appropriate based on today's review and evaluation  End-of-Life planning (with patient's consent)    Diagnoses and all orders for this visit:    1. Medicare annual wellness visit, subsequent    2. PVD (peripheral vascular disease) ([facility]    3.  Type 2 diabetes mellitus with other circulatory complication, without long-term current use of insulin (HCC)  -     HEMOGLOBIN A1C WITH EAG; Future  -     METABOLIC PANEL, BASIC; Future  -     LIPID PANEL; Future  -     ALT; Future  -     AST; Future        There are no preventive care reminders to display for this patient. Follow-up Disposition:  Return in about 4 months (around 12/8/2018) for dm/htn. An After Visit Summary was printed and given to the patient. This has been fully explained to the patient, who indicates understanding.

## 2018-08-08 NOTE — PATIENT INSTRUCTIONS
Medicare Wellness Visit, Male    The best way to live healthy is to have a lifestyle where you eat a well-balanced diet, exercise regularly, limit alcohol use, and quit all forms of tobacco/nicotine, if applicable. Regular preventive services are another way to keep healthy. Preventive services (vaccines, screening tests, monitoring & exams) can help personalize your care plan, which helps you manage your own care. Screening tests can find health problems at the earliest stages, when they are easiest to treat. [address] follows the current, evidence-based guidelines published by the [facility] ([facility]) when recommending preventive services for our patients. Because we follow these guidelines, sometimes recommendations change over time as research supports it. (For example, a prostate screening blood test is no longer routinely recommended for men with no symptoms.)    Of course, you and your provider may decide to screen more often for some diseases, based on your risk and co-morbidities (chronic disease you are already diagnosed with). Preventive services for you include:    - Medicare offers their members a free annual wellness visit, which is time for you and your primary care provider to discuss and plan for your preventive service needs. Take advantage of this benefit every year!    -All people over age 72 should receive the recommended pneumonia vaccines. Current USPSTF guidelines recommend a series of two vaccines for the best pneumonia protection.     -All adults should have a yearly flu vaccine and a tetanus vaccine every 10 years.  All adults age 61 years should receive a shingles vaccine once in their lifetime.      -All adults age 38-68 years who are overweight should have a diabetes screening test once every three years.     -Other screening tests & preventive services for persons with diabetes include: an eye exam to screen for diabetic retinopathy, a kidney function test, a foot exam, and stricter control over your cholesterol.     -Cardiovascular screening for adults with routine risk involves an electrocardiogram (ECG) at intervals determined by the provider.     -Colorectal cancer screenings should be done for adults age 54-65 years with normal risk. There are a number of acceptable methods of screening for this type of cancer. Each test has its own benefits and drawbacks. Discuss with your provider what is most appropriate for you during your annual wellness visit. The different tests include: colonoscopy (considered the best screening method), a fecal occult blood test, a fecal DNA test, and sigmoidoscopy.    -All adults born between [facility] should be screened once for Hepatitis C.    -An Abdominal Aortic Aneurysm (AAA) Screening is recommended for men age 73-68 who has ever smoked in their lifetime. Here is a list of your current Health Maintenance items (your personalized list of preventive services) with a due date: There are no preventive care reminders to display for this patient. Well Visit, Over 72: Care Instructions  Your Care Instructions    Physical exams can help you stay healthy. Your doctor has checked your overall health and may have suggested ways to take good care of yourself. He or she also may have recommended tests. At home, you can help prevent illness with healthy eating, regular exercise, and other steps. Follow-up care is a key part of your treatment and safety. Be sure to make and go to all appointments, and call your doctor if you are having problems. It's also a good idea to know your test results and keep a list of the medicines you take. How can you care for yourself at home? · Reach and stay at a healthy weight. This will lower your risk for many problems, such as obesity, diabetes, heart disease, and high blood pressure. · Get at least 30 minutes of exercise on most days of the week.  Walking is a good choice. You also may want to do other activities, such as running, swimming, cycling, or playing tennis or team sports. · Do not smoke. Smoking can make health problems worse. If you need help quitting, talk to your doctor about stop-smoking programs and medicines. These can increase your chances of quitting for good. · Protect your skin from too much sun. When you're outdoors from 10 a.m. to 4 p.m., stay in the shade or cover up with clothing and a hat with a wide brim. Wear sunglasses that block UV rays. Even when it's cloudy, put broad-spectrum sunscreen (SPF 30 or higher) on any exposed skin. · See a dentist one or two times a year for checkups and to have your teeth cleaned. · Wear a seat belt in the car. · Limit alcohol to 2 drinks a day for men and 1 drink a day for women. Too much alcohol can cause health problems. Follow your doctor's advice about when to have certain tests. These tests can spot problems early. For men and women  · Cholesterol. Your doctor will tell you how often to have this done based on your overall health and other things that can increase your risk for heart attack and stroke. · Blood pressure. Have your blood pressure checked during a routine doctor visit. Your doctor will tell you how often to check your blood pressure based on your age, your blood pressure results, and other factors. · Diabetes. Ask your doctor whether you should have tests for diabetes. · Vision. Experts recommend that you have yearly exams for glaucoma and other age-related eye problems. · Hearing. Tell your doctor if you notice any change in your hearing. You can have tests to find out how well you hear. · Colon cancer tests. Keep having colon cancer tests as your doctor recommends. You can have one of several types of tests. · Heart attack and stroke risk. At least every 4 to 6 years, you should have your risk for heart attack and stroke assessed.  Your doctor uses factors such as your age, blood pressure, cholesterol, and whether you smoke or have diabetes to show what your risk for a heart attack or stroke is over the next 10 years. · Osteoporosis. Talk to your doctor about whether you should have a bone density test to find out whether you have thinning bones. Also ask your doctor about whether you should take calcium and vitamin D supplements. For women  · Pap test and pelvic exam. You may no longer need a Pap test. Talk with your doctor about whether to stop or continue to have Pap tests. · Breast exam and mammogram. Ask how often you should have a mammogram, which is an X-ray of your breasts. A mammogram can spot breast cancer before it can be felt and when it is easiest to treat. · Thyroid disease. Talk to your doctor about whether to have your thyroid checked as part of a regular physical exam. Women have an increased chance of a thyroid problem. For men  · Prostate exam. Talk to your doctor about whether you should have a blood test (called a PSA test) for prostate cancer. Experts disagree on whether men should have this test. Some experts recommend that you discuss the benefits and risks of the test with your doctor. · Abdominal aortic aneurysm. Ask your doctor whether you should have a test to check for an aneurysm. You may need a test if you ever smoked or if your parent, brother, sister, or child has had an aneurysm. When should you call for help? Watch closely for changes in your health, and be sure to contact your doctor if you have any problems or symptoms that concern you. Where can you learn more? Go to http://carleen-harjinder.info/. Enter L504 in the search box to learn more about \"Well Visit, Over 65: Care Instructions. \"  Current as of: May 16, 2017  Content Version: 11.7  © 7525-7898 [employer]. Care instructions adapted under license by [employer] (which disclaims liability or warranty for this information).  If you have questions about a medical condition or this instruction, always ask your healthcare professional. [address] [address] any warranty or liability for your use of this information.

## 2018-08-08 NOTE — MR AVS SNAPSHOT
[address] [place] [address] 
 
 
 [address] [place] [address] 101 [address] [address] 
[phone] Patient: Zayda Bowens MRN:  [id number] Visit Information Date & Time Provider Department Dept. Phone Encounter #  
 8/8/2018 10:40 AM Alfredo Freeman, [address] [place] [address] [phone] [id number] Follow-up Instructions Return in about 4 months (around 12/8/2018) for dm/htn. Upcoming Health Maintenance Date Due  
 FOOT EXAM Q1 12/13/2018* EYE EXAM RETINAL OR DILATED Q1 12/18/2018* Pneumococcal 65+ Low/Medium Risk (2 of 2 - PPSV23) 8/8/2019* Influenza Age 5 to Adult 8/8/2019* HEMOGLOBIN A1C Q6M 10/3/2018 MICROALBUMIN Q1 12/7/2018 LIPID PANEL Q1 4/3/2019 GLAUCOMA SCREENING Q2Y 5/17/2019 MEDICARE YEARLY EXAM 8/9/2019 COLONOSCOPY 4/1/2025 DTaP/Tdap/Td series (2 - Td) 7/12/2027 *Topic was postponed. The date shown is not the original due date. Allergies as of 8/8/2018  Review Complete On: 8/8/2018 By: Alfredo Freeman MD  
 No Known Allergies Current Immunizations  Never Reviewed No immunizations on file. Not reviewed this visit You Were Diagnosed With   
  
 Codes Comments Medicare annual wellness visit, subsequent    -  Primary ICD-10-CM: Z00.00 ICD-9-CM: V70.0   
 PVD (peripheral vascular disease) ([facility] [address]     ICD-10-CM: I73.9 ICD-9-CM: 443. 9 Type 2 diabetes mellitus with other circulatory complication, without long-term current use of insulin (HCC)     ICD-10-CM: E11.59 
ICD-9-CM: 250.70 Vitals BP Pulse Temp Resp Height(growth percentile) Weight(growth percentile) 124/72 (BP 1 Location: Left arm, BP Patient Position: Sitting) 73 98 °F (36.7 °C) (Oral) 16 5' 5\" (1.651 m) 177 lb (80.3 kg) SpO2 BMI Smoking Status 96% 29.45 kg/m2 Current Every Day Smoker Vitals History BMI and BSA Data  Body Mass Index Body Surface Area  
 29.45 kg/m 2 1.92 m 2  
  
  
 Preferred Pharmacy Pharmacy Name Phone [id number] Surendra Agosto, [address] [facility] [facility] [phone] Your Updated Medication List  
  
   
This list is accurate as of 8/8/18 11:30 AM.  Always use your most recent med list.  
  
  
  
  
 hydroCHLOROthiazide 12.5 mg capsule Commonly known as:  Oleary Anahi TAKE 1 CAPSULE BY MOUTH DAILY  
  
 irbesartan 300 mg tablet Commonly known as:  AVAPRO TAKE 1 TABLET BY MOUTH DAILY. MULTI-VITAMIN PO Take  by mouth.  
  
 pravastatin 40 mg tablet Commonly known as:  PRAVACHOL  
TAKE 1 TABLET BY MOUTH DAILY Follow-up Instructions Return in about 4 months (around 12/8/2018) for dm/htn. To-Do List   
 08/08/2018 Lab:  ALT   
  
 08/08/2018 Lab:  AST   
  
 08/08/2018 Lab:  HEMOGLOBIN A1C WITH EAG   
  
 08/08/2018 Lab:  LIPID PANEL   
  
 08/08/2018 Lab:  METABOLIC PANEL, BASIC Patient Instructions Medicare Wellness Visit, Male The best way to live healthy is to have a lifestyle where you eat a well-balanced diet, exercise regularly, limit alcohol use, and quit all forms of tobacco/nicotine, if applicable. Regular preventive services are another way to keep healthy. Preventive services (vaccines, screening tests, monitoring & exams) can help personalize your care plan, which helps you manage your own care. Screening tests can find health problems at the earliest stages, when they are easiest to treat. [facility] follows the current, evidence-based guidelines published by the [facility] (USPSTF) when recommending preventive services for our patients. Because we follow these guidelines, sometimes recommendations change over time as research supports it. (For example, a prostate screening blood test is no longer routinely recommended for men with no symptoms.) Of course, you and your provider may decide to screen more often for some diseases, based on your risk and co-morbidities (chronic disease you are already diagnosed with). Preventive services for you include: - Medicare offers their members a free annual wellness visit, which is time for you and your primary care provider to discuss and plan for your preventive service needs. Take advantage of this benefit every year! 
 
-All people over age 72 should receive the recommended pneumonia vaccines. Current USPSTF guidelines recommend a series of two vaccines for the best pneumonia protection.  
 
-All adults should have a yearly flu vaccine and a tetanus vaccine every 10 years. All adults age 61 years should receive a shingles vaccine once in their lifetime.   
 
-All adults age 38-68 years who are overweight should have a diabetes screening test once every three years.  
 
-Other screening tests & preventive services for persons with diabetes include: an eye exam to screen for diabetic retinopathy, a kidney function test, a foot exam, and stricter control over your cholesterol.  
 
-Cardiovascular screening for adults with routine risk involves an electrocardiogram (ECG) at intervals determined by the provider.  
 
-Colorectal cancer screenings should be done for adults age 54-65 years with normal risk. There are a number of acceptable methods of screening for this type of cancer. Each test has its own benefits and drawbacks. Discuss with your provider what is most appropriate for you during your annual wellness visit. The different tests include: colonoscopy (considered the best screening method), a fecal occult blood test, a fecal DNA test, and sigmoidoscopy. 
 
-All adults born between [facility] should be screened once for Hepatitis C. 
 
-An Abdominal Aortic Aneurysm (AAA) Screening is recommended for men age 73-68 who has ever smoked in their lifetime. Here is a list of your current Health Maintenance items (your personalized list of preventive services) with a due date: There are no preventive care reminders to display for this patient. Well Visit, Over 72: Care Instructions Your Care Instructions Physical exams can help you stay healthy. Your doctor has checked your overall health and may have suggested ways to take good care of yourself. He or she also may have recommended tests. At home, you can help prevent illness with healthy eating, regular exercise, and other steps. Follow-up care is a key part of your treatment and safety. Be sure to make and go to all appointments, and call your doctor if you are having problems. It's also a good idea to know your test results and keep a list of the medicines you take. How can you care for yourself at home? · Reach and stay at a healthy weight. This will lower your risk for many problems, such as obesity, diabetes, heart disease, and high blood pressure. · Get at least 30 minutes of exercise on most days of the week. Walking is a good choice. You also may want to do other activities, such as running, swimming, cycling, or playing tennis or team sports. · Do not smoke. Smoking can make health problems worse. If you need help quitting, talk to your doctor about stop-smoking programs and medicines. These can increase your chances of quitting for good. · Protect your skin from too much sun. When you're outdoors from 10 a.m. to 4 p.m., stay in the shade or cover up with clothing and a hat with a wide brim. Wear sunglasses that block UV rays. Even when it's cloudy, put broad-spectrum sunscreen (SPF 30 or higher) on any exposed skin. · See a dentist one or two times a year for checkups and to have your teeth cleaned. · Wear a seat belt in the car. · Limit alcohol to 2 drinks a day for men and 1 drink a day for women. Too much alcohol can cause health problems. Follow your doctor's advice about when to have certain tests. These tests can spot problems early. For men and women · Cholesterol. Your doctor will tell you how often to have this done based on your overall health and other things that can increase your risk for heart attack and stroke. · Blood pressure. Have your blood pressure checked during a routine doctor visit. Your doctor will tell you how often to check your blood pressure based on your age, your blood pressure results, and other factors. · Diabetes. Ask your doctor whether you should have tests for diabetes. · Vision. Experts recommend that you have yearly exams for glaucoma and other age-related eye problems. · Hearing. Tell your doctor if you notice any change in your hearing. You can have tests to find out how well you hear. · Colon cancer tests. Keep having colon cancer tests as your doctor recommends. You can have one of several types of tests. · Heart attack and stroke risk. At least every 4 to 6 years, you should have your risk for heart attack and stroke assessed. Your doctor uses factors such as your age, blood pressure, cholesterol, and whether you smoke or have diabetes to show what your risk for a heart attack or stroke is over the next 10 years. · Osteoporosis. Talk to your doctor about whether you should have a bone density test to find out whether you have thinning bones. Also ask your doctor about whether you should take calcium and vitamin D supplements. For women · Pap test and pelvic exam. You may no longer need a Pap test. Talk with your doctor about whether to stop or continue to have Pap tests. · Breast exam and mammogram. Ask how often you should have a mammogram, which is an X-ray of your breasts. A mammogram can spot breast cancer before it can be felt and when it is easiest to treat. · Thyroid disease.  Talk to your doctor about whether to have your thyroid checked as part of a regular physical exam. Women have an increased chance of a thyroid problem. For men · Prostate exam. Talk to your doctor about whether you should have a blood test (called a PSA test) for prostate cancer. Experts disagree on whether men should have this test. Some experts recommend that you discuss the benefits and risks of the test with your doctor. · Abdominal aortic aneurysm. Ask your doctor whether you should have a test to check for an aneurysm. You may need a test if you ever smoked or if your parent, brother, sister, or child has had an aneurysm. When should you call for help? Watch closely for changes in your health, and be sure to contact your doctor if you have any problems or symptoms that concern you. Where can you learn more? Go to http://carleen-harjinder.info/. Enter V475 in the search box to learn more about \"Well Visit, Over 65: Care Instructions. \" Current as of: May 16, 2017 Content Version: 11.7 © 3982-7184 [employer]. Care instructions adapted under license by [employer] (which disclaims liability or warranty for this information). If you have questions about a medical condition or this instruction, always ask your healthcare professional. [place] [address] any warranty or liability for your use of this information. Introducing [facility]! [facility] introduces Productiv patient portal. Now you can access parts of your medical record, email your doctor's office, and request medication refills online. 1. In your internet browser, go to [email or web address] 2. Click on the First Time User? Click Here link in the Sign In box. You will see the New Member Sign Up page. 3. Enter your Productiv Access Code exactly as it appears below. You will not need to use this code after youve completed the sign-up process.  If you do not sign up before the expiration date, you must request a new code. · LetsCram Access Code: [email or web address] Expires: 11/6/2018 11:30 AM 
 
4. Enter the last four digits of your Social Security Number (xxxx) and Date of Birth (mm/dd/yyyy) as indicated and click Submit. You will be taken to the next sign-up page. 5. Create a LetsCram ID. This will be your LetsCram login ID and cannot be changed, so think of one that is secure and easy to remember. 6. Create a LetsCram password. You can change your password at any time. 7. Enter your Password Reset Question and Answer. This can be used at a later time if you forget your password. 8. Enter your e-mail address. You will receive e-mail notification when new information is available in [address]. 9. Click Sign Up. You can now view and download portions of your medical record. 10. Click the Download Summary menu link to download a portable copy of your medical information. If you have questions, please visit the Frequently Asked Questions section of the LetsCram website. Remember, LetsCram is NOT to be used for urgent needs. For medical emergencies, dial 911. Now available from your iPhone and Android! Please provide this summary of care documentation to your next provider. Your primary care clinician is listed as [address]. If you have any questions after today's visit, please call [phone].

## 2018-08-09 LAB
ALT SERPL-CCNC: 40 U/L (ref 16–61)
ANION GAP SERPL CALC-SCNC: 7 MMOL/L (ref 3–18)
AST SERPL-CCNC: 19 U/L (ref 15–37)
BUN SERPL-MCNC: 19 MG/DL (ref 7–18)
BUN/CREAT SERPL: 19 (ref 12–20)
CALCIUM SERPL-MCNC: 10.3 MG/DL (ref 8.5–10.1)
CHLORIDE SERPL-SCNC: 106 MMOL/L (ref 100–108)
CHOLEST SERPL-MCNC: 153 MG/DL
CO2 SERPL-SCNC: 30 MMOL/L (ref 21–32)
CREAT SERPL-MCNC: 1 MG/DL (ref 0.6–1.3)
GLUCOSE SERPL-MCNC: 127 MG/DL (ref 74–99)
HDLC SERPL-MCNC: 47 MG/DL (ref 40–60)
HDLC SERPL: 3.3 {RATIO} (ref 0–5)
LDLC SERPL CALC-MCNC: 92.4 MG/DL (ref 0–100)
LIPID PROFILE,FLP: NORMAL
POTASSIUM SERPL-SCNC: 4.9 MMOL/L (ref 3.5–5.5)
SODIUM SERPL-SCNC: 143 MMOL/L (ref 136–145)
TRIGL SERPL-MCNC: 68 MG/DL (ref ?–150)
VLDLC SERPL CALC-MCNC: 13.6 MG/DL

## 2018-12-11 ENCOUNTER — OFFICE VISIT (OUTPATIENT)
Dept: FAMILY MEDICINE CLINIC | Age: 73
End: 2018-12-11

## 2018-12-11 ENCOUNTER — HOSPITAL ENCOUNTER (OUTPATIENT)
Dept: LAB | Age: 73
Discharge: HOME | End: 2018-12-11
Payer: MEDICARE

## 2018-12-11 VITALS
TEMPERATURE: 97.9 F | HEART RATE: 80 BPM | WEIGHT: 177 LBS | BODY MASS INDEX: 29.49 KG/M2 | DIASTOLIC BLOOD PRESSURE: 76 MMHG | HEIGHT: 65 IN | OXYGEN SATURATION: 96 % | SYSTOLIC BLOOD PRESSURE: 130 MMHG | RESPIRATION RATE: 16 BRPM

## 2018-12-11 DIAGNOSIS — E78.00 HYPERCHOLESTEROLEMIA: ICD-10-CM

## 2018-12-11 DIAGNOSIS — E11.59 TYPE 2 DIABETES MELLITUS WITH OTHER CIRCULATORY COMPLICATION, WITHOUT LONG-TERM CURRENT USE OF INSULIN (HCC): ICD-10-CM

## 2018-12-11 DIAGNOSIS — I10 HTN (HYPERTENSION), BENIGN: ICD-10-CM

## 2018-12-11 DIAGNOSIS — E11.59 TYPE 2 DIABETES MELLITUS WITH OTHER CIRCULATORY COMPLICATION, WITHOUT LONG-TERM CURRENT USE OF INSULIN (HCC): Primary | ICD-10-CM

## 2018-12-11 LAB
ALT SERPL-CCNC: 35 U/L (ref 16–61)
ANION GAP SERPL CALC-SCNC: 5 MMOL/L (ref 3–18)
AST SERPL-CCNC: 20 U/L (ref 15–37)
BUN SERPL-MCNC: 22 MG/DL (ref 7–18)
BUN/CREAT SERPL: 22 (ref 12–20)
CALCIUM SERPL-MCNC: 10.2 MG/DL (ref 8.5–10.1)
CHLORIDE SERPL-SCNC: 103 MMOL/L (ref 100–108)
CHOLEST SERPL-MCNC: 163 MG/DL
CO2 SERPL-SCNC: 30 MMOL/L (ref 21–32)
CREAT SERPL-MCNC: 1.02 MG/DL (ref 0.6–1.3)
EST. AVERAGE GLUCOSE BLD GHB EST-MCNC: 154 MG/DL
GLUCOSE SERPL-MCNC: 134 MG/DL (ref 74–99)
HBA1C MFR BLD: 7 % (ref 4.2–5.6)
HDLC SERPL-MCNC: 50 MG/DL (ref 40–60)
HDLC SERPL: 3.3 {RATIO} (ref 0–5)
LDLC SERPL CALC-MCNC: 99.2 MG/DL (ref 0–100)
LIPID PROFILE,FLP: NORMAL
POTASSIUM SERPL-SCNC: 4.4 MMOL/L (ref 3.5–5.5)
SODIUM SERPL-SCNC: 138 MMOL/L (ref 136–145)
TRIGL SERPL-MCNC: 69 MG/DL (ref ?–150)
VLDLC SERPL CALC-MCNC: 13.8 MG/DL

## 2018-12-11 PROCEDURE — 84460 ALANINE AMINO (ALT) (SGPT): CPT

## 2018-12-11 PROCEDURE — 80061 LIPID PANEL: CPT

## 2018-12-11 PROCEDURE — 36415 COLL VENOUS BLD VENIPUNCTURE: CPT

## 2018-12-11 PROCEDURE — 83036 HEMOGLOBIN GLYCOSYLATED A1C: CPT

## 2018-12-11 PROCEDURE — 80048 BASIC METABOLIC PNL TOTAL CA: CPT

## 2018-12-11 PROCEDURE — 84450 TRANSFERASE (AST) (SGOT): CPT

## 2018-12-11 PROCEDURE — 82043 UR ALBUMIN QUANTITATIVE: CPT

## 2018-12-11 RX ORDER — HYDROCHLOROTHIAZIDE 12.5 MG/1
CAPSULE ORAL
Qty: 90 CAP | Refills: 3 | Status: SHIPPED | OUTPATIENT
Start: 2018-12-11 | End: 2020-01-07 | Stop reason: SDUPTHER

## 2018-12-11 RX ORDER — PRAVASTATIN SODIUM 40 MG/1
TABLET ORAL
Qty: 90 TAB | Refills: 3 | Status: SHIPPED | OUTPATIENT
Start: 2018-12-11 | End: 2020-01-07 | Stop reason: SDUPTHER

## 2018-12-11 RX ORDER — IRBESARTAN 300 MG/1
TABLET ORAL
Qty: 90 TAB | Refills: 3 | Status: SHIPPED | OUTPATIENT
Start: 2018-12-11 | End: 2019-07-03 | Stop reason: RX

## 2018-12-11 NOTE — PROGRESS NOTES
HISTORY OF PRESENT ILLNESS  Lynn Travis is a 75 y.o. male. HPI  Patient is here today for evaluation and treatment of: Diabetes/Hypertension/Cholesterol    Diabetes: This is controlled with diet and exercise. Last A1c was stable ; Pt is in need of urine microalbumin. Lab Results   Component Value Date/Time    Hemoglobin A1c 6.7 (H) 08/08/2018 11:39 AM          Hypertension: Pt is on  MIcrozide. Takes med daily. He's compliant with med. BP is stable    Cholesterol: He is on Pravachol; Takes med daily. Pt has no new physical complaints; He feels well. Current Outpatient Medications:     pravastatin (PRAVACHOL) 40 mg tablet, TAKE 1 TABLET BY MOUTH DAILY, Disp: 90 Tab, Rfl: 3    hydroCHLOROthiazide (MICROZIDE) 12.5 mg capsule, TAKE 1 CAPSULE BY MOUTH DAILY, Disp: 90 Cap, Rfl: 3    irbesartan (AVAPRO) 300 mg tablet, TAKE 1 TABLET BY MOUTH DAILY. , Disp: 90 Tab, Rfl: 3    MULTIVITAMINS (MULTI-VITAMIN PO), Take  by mouth., Disp: , Rfl:       PMH,  Meds, Allergies, Family History, Social history reviewed    Review of Systems   Constitutional: Negative for chills and fever. Respiratory: Negative for shortness of breath and wheezing. Cardiovascular: Negative for chest pain and palpitations. Physical Exam   Constitutional: He appears well-developed and well-nourished. No distress. Cardiovascular: Normal rate and regular rhythm. Exam reveals no gallop and no friction rub. No murmur heard. Pulmonary/Chest: Breath sounds normal. No respiratory distress. He has no wheezes. He has no rales. Musculoskeletal: He exhibits no edema. Nursing note and vitals reviewed.     Lab Results   Component Value Date/Time    Cholesterol, total 153 08/08/2018 11:39 AM    HDL Cholesterol 47 08/08/2018 11:39 AM    LDL, calculated 92.4 08/08/2018 11:39 AM    VLDL, calculated 13.6 08/08/2018 11:39 AM    Triglyceride 68 08/08/2018 11:39 AM    CHOL/HDL Ratio 3.3 08/08/2018 11:39 AM     Lab Results Component Value Date/Time    Hemoglobin A1c 6.7 (H) 08/08/2018 11:39 AM     Lab Results   Component Value Date/Time    Sodium 143 08/08/2018 11:39 AM    Potassium 4.9 08/08/2018 11:39 AM    Chloride 106 08/08/2018 11:39 AM    CO2 30 08/08/2018 11:39 AM    Anion gap 7 08/08/2018 11:39 AM    Glucose 127 (H) 08/08/2018 11:39 AM    BUN 19 (H) 08/08/2018 11:39 AM    Creatinine 1.00 08/08/2018 11:39 AM    BUN/Creatinine ratio 19 08/08/2018 11:39 AM    GFR est AA >60 08/08/2018 11:39 AM    GFR est non-AA >60 08/08/2018 11:39 AM    Calcium 10.3 (H) 08/08/2018 11:39 AM       ASSESSMENT and PLAN    ICD-10-CM ICD-9-CM    1. Type 2 diabetes mellitus with other circulatory complication, without long-term current use of insulin (HCC) E11.59 250.70 MICROALBUMIN, UR, RAND W/ MICROALB/CREAT RATIO      HEMOGLOBIN A1C WITH EAG   2. HTN (hypertension), benign O20 831.4 METABOLIC PANEL, BASIC   3. Hypercholesterolemia E78.00 272.0 LIPID PANEL      AST      ALT       As above,  treatment plan as listed below  Orders Placed This Encounter    MICROALBUMIN, UR, RAND W/ MICROALB/CREAT RATIO    LIPID PANEL    METABOLIC PANEL, BASIC    HEMOGLOBIN A1C WITH EAG    AST    ALT    pravastatin (PRAVACHOL) 40 mg tablet    hydroCHLOROthiazide (MICROZIDE) 12.5 mg capsule    irbesartan (AVAPRO) 300 mg tablet     Refilled her meds as ordered  Labs as ordered  Advised healthy diet and exercise as tolerated;  BMI reviewed  ACP note given/documented    Follow-up Disposition:  Return in about 4 months (around 4/11/2019) for htn/chol/dm. An After Visit Summary was printed and given to the patient. This has been fully explained to the patient, who indicates understanding.

## 2018-12-11 NOTE — ACP (ADVANCE CARE PLANNING)
Advance Care Planning (ACP) Provider Conversation Snapshot    Date of ACP Conversation: 12/11/18  Persons included in Conversation:  patient  Length of ACP Conversation in minutes:  <16 minutes (Non-Billable)    Authorized Decision Maker (if patient is incapable of making informed decisions):    This person is:   NA          For Patients with Decision Making Capacity:   TBD    Conversation Outcomes / Follow-Up Plan:   Recommended completion of Advance Directive form after review of ACP materials and conversation with prospective healthcare agent   Recommended communicating the plan and making copies for the healthcare agent, personal physician, and others as appropriate (e.g., health system)

## 2018-12-11 NOTE — PATIENT INSTRUCTIONS
High Cholesterol: Care Instructions  Your Care Instructions    Cholesterol is a type of fat in your blood. It is needed for many body functions, such as making new cells. Cholesterol is made by your body. It also comes from food you eat. High cholesterol means that you have too much of the fat in your blood. This raises your risk of a heart attack and stroke. LDL and HDL are part of your total cholesterol. LDL is the \"bad\" cholesterol. High LDL can raise your risk for heart disease, heart attack, and stroke. HDL is the \"good\" cholesterol. It helps clear bad cholesterol from the body. High HDL is linked with a lower risk of heart disease, heart attack, and stroke. Your cholesterol levels help your doctor find out your risk for having a heart attack or stroke. You and your doctor can talk about whether you need to lower your risk and what treatment is best for you. A heart-healthy lifestyle along with medicines can help lower your cholesterol and your risk. The way you choose to lower your risk will depend on how high your risk is for heart attack and stroke. It will also depend on how you feel about taking medicines. Follow-up care is a key part of your treatment and safety. Be sure to make and go to all appointments, and call your doctor if you are having problems. It's also a good idea to know your test results and keep a list of the medicines you take. How can you care for yourself at home? · Eat a variety of foods every day. Good choices include fruits, vegetables, whole grains (like oatmeal), dried beans and peas, nuts and seeds, soy products (like tofu), and fat-free or low-fat dairy products. · Replace butter, margarine, and hydrogenated or partially hydrogenated oils with olive and canola oils. (Canola oil margarine without trans fat is fine.)  · Replace red meat with fish, poultry, and soy protein (like tofu). · Limit processed and packaged foods like chips, crackers, and cookies.   · Bake, broil, or steam foods. Don't tripathi them. · Be physically active. Get at least 30 minutes of exercise on most days of the week. Walking is a good choice. You also may want to do other activities, such as running, swimming, cycling, or playing tennis or team sports. · Stay at a healthy weight or lose weight by making the changes in eating and physical activity listed above. Losing just a small amount of weight, even 5 to 10 pounds, can reduce your risk for having a heart attack or stroke. · Do not smoke. When should you call for help? Watch closely for changes in your health, and be sure to contact your doctor if:    · You need help making lifestyle changes.     · You have questions about your medicine. Where can you learn more? Go to http://carleen-harjinder.info/. Enter A678 in the search box to learn more about \"High Cholesterol: Care Instructions. \"  Current as of: December 6, 2017  Content Version: 11.8  © 0918-0541 [employer]. Care instructions adapted under license by [employer] (which disclaims liability or warranty for this information). If you have questions about a medical condition or this instruction, always ask your healthcare professional. [address] any warranty or liability for your use of this information. Body Mass Index: Care Instructions  Your Care Instructions    Body mass index (BMI) can help you see if your weight is raising your risk for health problems. It uses a formula to compare how much you weigh with how tall you are. · A BMI lower than 18.5 is considered underweight. · A BMI between 18.5 and 24.9 is considered healthy. · A BMI between 25 and 29.9 is considered overweight. A BMI of 30 or higher is considered obese. If your BMI is in the normal range, it means that you have a lower risk for weight-related health problems.  If your BMI is in the overweight or obese range, you may be at increased risk for weight-related health problems, such as high blood pressure, heart disease, stroke, arthritis or joint pain, and diabetes. If your BMI is in the underweight range, you may be at increased risk for health problems such as fatigue, lower protection (immunity) against illness, muscle loss, bone loss, hair loss, and hormone problems. BMI is just one measure of your risk for weight-related health problems. You may be at higher risk for health problems if you are not active, you eat an unhealthy diet, or you drink too much alcohol or use tobacco products. Follow-up care is a key part of your treatment and safety. Be sure to make and go to all appointments, and call your doctor if you are having problems. It's also a good idea to know your test results and keep a list of the medicines you take. How can you care for yourself at home? · Practice healthy eating habits. This includes eating plenty of fruits, vegetables, whole grains, lean protein, and low-fat dairy. · If your doctor recommends it, get more exercise. Walking is a good choice. Bit by bit, increase the amount you walk every day. Try for at least 30 minutes on most days of the week. · Do not smoke. Smoking can increase your risk for health problems. If you need help quitting, talk to your doctor about stop-smoking programs and medicines. These can increase your chances of quitting for good. · Limit alcohol to 2 drinks a day for men and 1 drink a day for women. Too much alcohol can cause health problems. If you have a BMI higher than 25  · Your doctor may do other tests to check your risk for weight-related health problems. This may include measuring the distance around your waist. A waist measurement of more than 40 inches in men or 35 inches in women can increase the risk of weight-related health problems. · Talk with your doctor about steps you can take to stay healthy or improve your health.  You may need to make lifestyle changes to lose weight and stay healthy, such as changing your diet and getting regular exercise. If you have a BMI lower than 18.5  · Your doctor may do other tests to check your risk for health problems. · Talk with your doctor about steps you can take to stay healthy or improve your health. You may need to make lifestyle changes to gain or maintain weight and stay healthy, such as getting more healthy foods in your diet and doing exercises to build muscle. Where can you learn more? Go to http://carleen-harjinder.info/. Enter S176 in the search box to learn more about \"Body Mass Index: Care Instructions. \"  Current as of: June 26, 2018  Content Version: 11.8  © 6478-4734 [employer], [employer]. Care instructions adapted under license by [employer] (which disclaims liability or warranty for this information). If you have questions about a medical condition or this instruction, always ask your healthcare professional. [address] any warranty or liability for your use of this information.

## 2018-12-12 LAB
CREAT UR-MCNC: 118 MG/DL (ref 30–125)
MICROALBUMIN UR-MCNC: 4.27 MG/DL (ref 0–3)
MICROALBUMIN/CREAT UR-RTO: 36 MG/G (ref 0–30)

## 2019-04-18 ENCOUNTER — HOSPITAL ENCOUNTER (OUTPATIENT)
Dept: LAB | Age: 74
Discharge: HOME | End: 2019-04-18
Payer: MEDICARE

## 2019-04-18 ENCOUNTER — OFFICE VISIT (OUTPATIENT)
Dept: FAMILY MEDICINE CLINIC | Age: 74
End: 2019-04-18

## 2019-04-18 VITALS
BODY MASS INDEX: 29.96 KG/M2 | HEART RATE: 88 BPM | RESPIRATION RATE: 20 BRPM | HEIGHT: 65 IN | TEMPERATURE: 97.8 F | WEIGHT: 179.8 LBS | SYSTOLIC BLOOD PRESSURE: 136 MMHG | OXYGEN SATURATION: 97 % | DIASTOLIC BLOOD PRESSURE: 72 MMHG

## 2019-04-18 DIAGNOSIS — I10 HTN (HYPERTENSION), BENIGN: ICD-10-CM

## 2019-04-18 DIAGNOSIS — Z79.4 TYPE 2 DIABETES MELLITUS WITH HYPERGLYCEMIA, WITH LONG-TERM CURRENT USE OF INSULIN (HCC): ICD-10-CM

## 2019-04-18 DIAGNOSIS — E11.65 TYPE 2 DIABETES MELLITUS WITH HYPERGLYCEMIA, WITH LONG-TERM CURRENT USE OF INSULIN (HCC): ICD-10-CM

## 2019-04-18 DIAGNOSIS — E78.00 HYPERCHOLESTEROLEMIA: Primary | ICD-10-CM

## 2019-04-18 DIAGNOSIS — E78.00 HYPERCHOLESTEROLEMIA: ICD-10-CM

## 2019-04-18 PROBLEM — E11.21 TYPE 2 DIABETES WITH NEPHROPATHY (HCC): Status: ACTIVE | Noted: 2019-04-18

## 2019-04-18 LAB
ALT SERPL-CCNC: 48 U/L (ref 16–61)
ANION GAP SERPL CALC-SCNC: 7 MMOL/L (ref 3–18)
AST SERPL-CCNC: 24 U/L (ref 15–37)
BUN SERPL-MCNC: 18 MG/DL (ref 7–18)
BUN/CREAT SERPL: 19 (ref 12–20)
CALCIUM SERPL-MCNC: 9.5 MG/DL (ref 8.5–10.1)
CHLORIDE SERPL-SCNC: 108 MMOL/L (ref 100–108)
CHOLEST SERPL-MCNC: 145 MG/DL
CO2 SERPL-SCNC: 26 MMOL/L (ref 21–32)
CREAT SERPL-MCNC: 0.96 MG/DL (ref 0.6–1.3)
EST. AVERAGE GLUCOSE BLD GHB EST-MCNC: 151 MG/DL
GLUCOSE SERPL-MCNC: 129 MG/DL (ref 74–99)
HBA1C MFR BLD: 6.9 % (ref 4.2–5.6)
HDLC SERPL-MCNC: 51 MG/DL (ref 40–60)
HDLC SERPL: 2.8 {RATIO} (ref 0–5)
LDLC SERPL CALC-MCNC: 86.8 MG/DL (ref 0–100)
LIPID PROFILE,FLP: NORMAL
POTASSIUM SERPL-SCNC: 4.1 MMOL/L (ref 3.5–5.5)
SODIUM SERPL-SCNC: 141 MMOL/L (ref 136–145)
TRIGL SERPL-MCNC: 36 MG/DL (ref ?–150)
VLDLC SERPL CALC-MCNC: 7.2 MG/DL

## 2019-04-18 PROCEDURE — 36415 COLL VENOUS BLD VENIPUNCTURE: CPT

## 2019-04-18 PROCEDURE — 84460 ALANINE AMINO (ALT) (SGPT): CPT

## 2019-04-18 PROCEDURE — 80048 BASIC METABOLIC PNL TOTAL CA: CPT

## 2019-04-18 PROCEDURE — 83036 HEMOGLOBIN GLYCOSYLATED A1C: CPT

## 2019-04-18 PROCEDURE — 84450 TRANSFERASE (AST) (SGOT): CPT

## 2019-04-18 PROCEDURE — 80061 LIPID PANEL: CPT

## 2019-04-18 NOTE — PROGRESS NOTES
Patient attended Phase 2 Cardiac Rehab Exercise Session. Further documentation will be completed in Cardiac [employer] System and will be scanned into the medical record upon discharge.    Patient here for a HTN, DM, Cholesterol Follow up. 1. Have you been to the ER, urgent care clinic since your last visit? Hospitalized since your last visit? No 
 
2. Have you seen or consulted any other health care providers outside of the [address] [place] [address] since your last visit? Include any pap smears or colon screening.  No

## 2019-04-18 NOTE — PROGRESS NOTES
HISTORY OF PRESENT ILLNESS Zayda Bowens is a 75 y.o. male. HPI Patient is here today for evaluation and treatment of: Diabetes/Hypertension/Cholesterol problem Diabetes:    Last A1c was as listed below. He declines a pneumonia shot and diabetic foot exam referral;  He has a podiatry. Lab Results Component Value Date/Time Hemoglobin A1c 7.0 (H) 12/11/2018 11:29 AM  
 
 
 
Hypertension:    Initial BP is elevated. Better at second check; on meds as listed below; Cholesterol:  Continues on pravachol. Takes med daily. Attempts a lower cholesterol diet. He has no new complaints Current Outpatient Medications:  
  pravastatin (PRAVACHOL) 40 mg tablet, TAKE 1 TABLET BY MOUTH DAILY, Disp: 90 Tab, Rfl: 3 
  hydroCHLOROthiazide (MICROZIDE) 12.5 mg capsule, TAKE 1 CAPSULE BY MOUTH DAILY, Disp: 90 Cap, Rfl: 3 
  irbesartan (AVAPRO) 300 mg tablet, TAKE 1 TABLET BY MOUTH DAILY. , Disp: 90 Tab, Rfl: 3 
  MULTIVITAMINS (MULTI-VITAMIN PO), Take  by mouth., Disp: , Rfl:  
 
 
PMH,  Meds, Allergies, Family History, Social history reviewed Review of Systems Constitutional: Negative for chills and fever. Respiratory: Negative for shortness of breath and wheezing. Cardiovascular: Negative for chest pain and palpitations. Physical Exam  
Visit Vitals /72 Pulse 88 Temp 97.8 °F (36.6 °C) (Oral) Resp 20 Ht 5' 5\" (1.651 m) Wt 179 lb 12.8 oz (81.6 kg) SpO2 97% BMI 29.92 kg/m² General appearance: alert, cooperative, no distress, appears stated age Neck: supple, symmetrical, trachea midline, no adenopathy, thyroid: not enlarged, symmetric, no tenderness/mass/nodules, no carotid bruit and no JVD Lungs: clear to auscultation bilaterally Heart: regular rate and rhythm, S1, S2 normal, no murmur, click, rub or gallop Extremities: left AKA with prosthetic Lab Results Component Value Date/Time  Cholesterol, total 163 12/11/2018 11:29 AM  
 HDL Cholesterol 50 12/11/2018 11:29 AM  
 LDL, calculated 99.2 12/11/2018 11:29 AM  
 VLDL, calculated 13.8 12/11/2018 11:29 AM  
 Triglyceride 69 12/11/2018 11:29 AM  
 CHOL/HDL Ratio 3.3 12/11/2018 11:29 AM  
 
Lab Results Component Value Date/Time Sodium 138 12/11/2018 11:29 AM  
 Potassium 4.4 12/11/2018 11:29 AM  
 Chloride 103 12/11/2018 11:29 AM  
 CO2 30 12/11/2018 11:29 AM  
 Anion gap 5 12/11/2018 11:29 AM  
 Glucose 134 (H) 12/11/2018 11:29 AM  
 BUN 22 (H) 12/11/2018 11:29 AM  
 Creatinine 1.02 12/11/2018 11:29 AM  
 BUN/Creatinine ratio 22 (H) 12/11/2018 11:29 AM  
 GFR est AA >60 12/11/2018 11:29 AM  
 GFR est non-AA >60 12/11/2018 11:29 AM  
 Calcium 10.2 (H) 12/11/2018 11:29 AM  
 
 
 
ASSESSMENT and PLAN 
  ICD-10-CM ICD-9-CM 1. Hypercholesterolemia E78.00 272.0 LIPID PANEL  
   AST ALT 2. Type 2 diabetes mellitus with hyperglycemia, with long-term current use of insulin (HCC) E11.65 250.00 HEMOGLOBIN A1C WITH EAG  
 Z79.4 790.29 V58.67   
3. HTN (hypertension), benign G22 385.3 METABOLIC PANEL, BASIC As above,  
above all stable unless otherwise noted 
 treatment plan as listed below Orders Placed This Encounter  HEMOGLOBIN A1C WITH EAG  
 METABOLIC PANEL, BASIC  
 LIPID PANEL  
 AST  ALT Follow-up and Dispositions · Return in about 4 months (around 8/18/2019) for medicare well; Bethany Parker After Visit Summary was printed and given to the patient. ,\This has been fully explained to the patient, who indicates understanding.  
Advised pt to follow up with podiatrist.

## 2019-06-27 ENCOUNTER — TELEPHONE (OUTPATIENT)
Dept: FAMILY MEDICINE CLINIC | Age: 74
End: 2019-06-27

## 2019-06-27 NOTE — TELEPHONE ENCOUNTER
Irbesartan 300 mg tablet is currently on back order. Will need alternative called into the pharmacy.

## 2019-07-02 NOTE — TELEPHONE ENCOUNTER
Patient calling checking on medication. Stated he checked with the pharmacy and they told him they were waiting to hear back from the provider.  Patient asking for a call back regarding medication

## 2019-07-03 RX ORDER — VALSARTAN 160 MG/1
160 TABLET ORAL DAILY
Qty: 30 TAB | Refills: 6 | Status: SHIPPED | OUTPATIENT
Start: 2019-07-03 | End: 2020-01-07 | Stop reason: SDUPTHER

## 2019-09-05 ENCOUNTER — HOSPITAL ENCOUNTER (OUTPATIENT)
Dept: LAB | Age: 74
Discharge: HOME | End: 2019-09-05
Payer: MEDICARE

## 2019-09-05 ENCOUNTER — OFFICE VISIT (OUTPATIENT)
Dept: FAMILY MEDICINE CLINIC | Age: 74
End: 2019-09-05

## 2019-09-05 VITALS
WEIGHT: 176.6 LBS | SYSTOLIC BLOOD PRESSURE: 140 MMHG | BODY MASS INDEX: 29.42 KG/M2 | TEMPERATURE: 97.6 F | HEIGHT: 65 IN | DIASTOLIC BLOOD PRESSURE: 68 MMHG | HEART RATE: 79 BPM | OXYGEN SATURATION: 97 % | RESPIRATION RATE: 18 BRPM

## 2019-09-05 DIAGNOSIS — Z00.00 MEDICARE ANNUAL WELLNESS VISIT, SUBSEQUENT: Primary | ICD-10-CM

## 2019-09-05 DIAGNOSIS — Z79.4 TYPE 2 DIABETES MELLITUS WITH HYPERGLYCEMIA, WITH LONG-TERM CURRENT USE OF INSULIN (HCC): ICD-10-CM

## 2019-09-05 DIAGNOSIS — I10 HTN (HYPERTENSION), BENIGN: ICD-10-CM

## 2019-09-05 DIAGNOSIS — E11.65 TYPE 2 DIABETES MELLITUS WITH HYPERGLYCEMIA, WITH LONG-TERM CURRENT USE OF INSULIN (HCC): ICD-10-CM

## 2019-09-05 LAB
ALT SERPL-CCNC: 35 U/L (ref 16–61)
ANION GAP SERPL CALC-SCNC: 6 MMOL/L (ref 3–18)
AST SERPL-CCNC: 20 U/L (ref 10–38)
BUN SERPL-MCNC: 18 MG/DL (ref 7–18)
BUN/CREAT SERPL: 19 (ref 12–20)
CALCIUM SERPL-MCNC: 9.9 MG/DL (ref 8.5–10.1)
CHLORIDE SERPL-SCNC: 107 MMOL/L (ref 100–111)
CO2 SERPL-SCNC: 28 MMOL/L (ref 21–32)
CREAT SERPL-MCNC: 0.95 MG/DL (ref 0.6–1.3)
EST. AVERAGE GLUCOSE BLD GHB EST-MCNC: 148 MG/DL
GLUCOSE SERPL-MCNC: 110 MG/DL (ref 74–99)
HBA1C MFR BLD: 6.8 % (ref 4.2–5.6)
POTASSIUM SERPL-SCNC: 4.1 MMOL/L (ref 3.5–5.5)
SODIUM SERPL-SCNC: 141 MMOL/L (ref 136–145)

## 2019-09-05 PROCEDURE — 36415 COLL VENOUS BLD VENIPUNCTURE: CPT

## 2019-09-05 PROCEDURE — 83036 HEMOGLOBIN GLYCOSYLATED A1C: CPT

## 2019-09-05 PROCEDURE — 80061 LIPID PANEL: CPT

## 2019-09-05 PROCEDURE — 84450 TRANSFERASE (AST) (SGOT): CPT

## 2019-09-05 PROCEDURE — 84460 ALANINE AMINO (ALT) (SGPT): CPT

## 2019-09-05 PROCEDURE — 80048 BASIC METABOLIC PNL TOTAL CA: CPT

## 2019-09-05 NOTE — PROGRESS NOTES
Patient is here for medicare wellness check. 1. Have you been to the ER, urgent care clinic since your last visit? Hospitalized since your last visit? No    2. Have you seen or consulted any other health care providers outside of the [address] [place] [address] since your last visit? Include any pap smears or colon screening.  No

## 2019-09-05 NOTE — PROGRESS NOTES
This is the Subsequent Medicare Annual Wellness Exam, performed 12 months or more after the Initial AWV or the last Subsequent AWV    I have reviewed the patient's medical history in detail and updated the computerized patient record. Was started on Diovan as avapro was on back order; He has tolerated med. He has no new complaints; He does report some congestion when he goes outside. Says he has to schedule his eye exam    History     Past Medical History:   Diagnosis Date    Arthritis     hands    Cataract     left eye    Diabetes ([facility]     diet controlled    DM (diabetes mellitus) ([facility] 2/3/2010    HTN (hypertension), benign     Hypercholesterolemia     PVD (peripheral vascular disease) ([facility] 2/3/2010      Past Surgical History:   Procedure Laterality Date    CARDIAC SURG PROCEDURE UNLIST  1995    left fem/pop bypass    CARDIAC SURG PROCEDURE UNLIST  1997    occlusion bypass angiographic thrombolysis failed    CARDIAC SURG PROCEDURE UNLIST      s/p left illiac/pop    HX AMPUTATION      left leg aka prosthesis due to PVD     Current Outpatient Medications   Medication Sig Dispense Refill    valsartan (DIOVAN) 160 mg tablet Take 1 Tab by mouth daily. 30 Tab 6    pravastatin (PRAVACHOL) 40 mg tablet TAKE 1 TABLET BY MOUTH DAILY 90 Tab 3    hydroCHLOROthiazide (MICROZIDE) 12.5 mg capsule TAKE 1 CAPSULE BY MOUTH DAILY 90 Cap 3    MULTIVITAMINS (MULTI-VITAMIN PO) Take  by mouth.        No Known Allergies  Family History   Problem Relation Age of Onset    Hypertension Father      Social History     Tobacco Use    Smoking status: Current Every Day Smoker     Types: Cigarettes    Smokeless tobacco: Never Used   Substance Use Topics    Alcohol use: No     Patient Active Problem List   Diagnosis Code    Gout M10.9    PVD (peripheral vascular disease) (HCC) I73.9    Hypercholesterolemia E78.00    Arthritis M19.90    Cataract H26.9    DM (diabetes mellitus) ([facility] E11.9    HTN (hypertension), benign I10    Advanced directives, counseling/discussion Z71.89    Type 2 diabetes with nephropathy ([facility] E11.21       Depression Risk Factor Screening:     3 most recent PHQ Screens 9/5/2019   Little interest or pleasure in doing things Not at all   Feeling down, depressed, irritable, or hopeless Not at all   Total Score PHQ 2 0     Alcohol Risk Factor Screening: You do not drink alcohol or very rarely. Functional Ability and Level of Safety:   Hearing Loss  Hearing is good. Activities of Daily Living  The home contains:motorized mobility device  Patient does total self care    Fall Risk  Fall Risk Assessment, last 12 mths 9/5/2019   Able to walk? Yes   Fall in past 12 months?  No     Uses powered mobility device  Abuse Screen  Patient is not abused    Cognitive Screening   Evaluation of Cognitive Function:  Has your family/caregiver stated any concerns about your memory: no  Normal    Patient Care Team   Patient Care Team:  Tomi Naranjo MD as PCP - Kayla Teran MD (Podiatry)  Jenny Richey MD (Ophthalmology)     PE:  Visit Vitals  /68   Pulse 79   Temp 97.6 °F (36.4 °C) (Oral)   Resp 18   Ht 5' 5\" (1.651 m)   Wt 176 lb 9.6 oz (80.1 kg)   SpO2 97%   BMI 29.39 kg/m²     General appearance: alert, cooperative, no distress, appears stated age    Lungs: clear to auscultation bilaterally  Heart: regular rate and rhythm, S1, S2 normal, no murmur, click, rub or gallop    Extremities: extremities normal, atraumatic, no cyanosis or edema    Lab Results   Component Value Date/Time    Cholesterol, total 145 04/18/2019 11:45 AM    HDL Cholesterol 51 04/18/2019 11:45 AM    LDL, calculated 86.8 04/18/2019 11:45 AM    VLDL, calculated 7.2 04/18/2019 11:45 AM    Triglyceride 36 04/18/2019 11:45 AM    CHOL/HDL Ratio 2.8 04/18/2019 11:45 AM     Lab Results   Component Value Date/Time    Sodium 141 04/18/2019 11:45 AM    Potassium 4.1 04/18/2019 11:45 AM    Chloride 108 04/18/2019 11:45 AM CO2 26 04/18/2019 11:45 AM    Anion gap 7 04/18/2019 11:45 AM    Glucose 129 (H) 04/18/2019 11:45 AM    BUN 18 04/18/2019 11:45 AM    Creatinine 0.96 04/18/2019 11:45 AM    BUN/Creatinine ratio 19 04/18/2019 11:45 AM    GFR est AA >60 04/18/2019 11:45 AM    GFR est non-AA >60 04/18/2019 11:45 AM    Calcium 9.5 04/18/2019 11:45 AM     Lab Results   Component Value Date/Time    Hemoglobin A1c 6.8 (H) 09/05/2019 11:31 AM           Assessment/Plan   Education and counseling provided:  Are appropriate based on today's review and evaluation  End-of-Life planning (with patient's consent)    Diagnoses and all orders for this visit:    1. Medicare annual wellness visit, subsequent    2. Type 2 diabetes mellitus with hyperglycemia, with long-term current use of insulin ([facility]- for lab  -     LIPID PANEL; Future  -     AST; Future  -     ALT; Future  -     HEMOGLOBIN A1C WITH EAG; Future    3. HTN (hypertension), benign- for lab  -     METABOLIC PANEL, BASIC; Future        Health Maintenance Due   Topic Date Due    GLAUCOMA SCREENING Q2Y  05/17/2019    EYE EXAM RETINAL OR DILATED  05/17/2019     As above  treatment plan as listed below  Orders Placed This Encounter    METABOLIC PANEL, BASIC    LIPID PANEL    AST    ALT    HEMOGLOBIN A1C WITH EAG     Follow-up and Dispositions    · Return in about 5 months (around 2/5/2020) for htn/chol/DM. An After Visit Summary was printed and given to the patient. This has been fully explained to the patient, who indicates understanding.

## 2019-09-05 NOTE — PATIENT INSTRUCTIONS
Medicare Wellness Visit, Male    The best way to live healthy is to have a lifestyle where you eat a well-balanced diet, exercise regularly, limit alcohol use, and quit all forms of tobacco/nicotine, if applicable. Regular preventive services are another way to keep healthy. Preventive services (vaccines, screening tests, monitoring & exams) can help personalize your care plan, which helps you manage your own care. Screening tests can find health problems at the earliest stages, when they are easiest to treat. [facility] follows the current, evidence-based guidelines published by the [facility] ([facility]) when recommending preventive services for our patients. Because we follow these guidelines, sometimes recommendations change over time as research supports it. (For example, a prostate screening blood test is no longer routinely recommended for men with no symptoms.)  Of course, you and your doctor may decide to screen more often for some diseases, based on your risk and co-morbidities (chronic disease you are already diagnosed with). Preventive services for you include:  - Medicare offers their members a free annual wellness visit, which is time for you and your primary care provider to discuss and plan for your preventive service needs. Take advantage of this benefit every year!  -All adults over age 72 should receive the recommended pneumonia vaccines. Current USPSTF guidelines recommend a series of two vaccines for the best pneumonia protection.   -All adults should have a flu vaccine yearly and an ECG.  All adults age 61 and older should receive a shingles vaccine once in their lifetime.    -All adults age 38-68 who are overweight should have a diabetes screening test once every three years.   -Other screening tests & preventive services for persons with diabetes include: an eye exam to screen for diabetic retinopathy, a kidney function test, a foot exam, and stricter control over your cholesterol.   -Cardiovascular screening for adults with routine risk involves an electrocardiogram (ECG) at intervals determined by the provider.   -Colorectal cancer screening should be done for adults age 54-65 with no increased risk factors for colorectal cancer. There are a number of acceptable methods of screening for this type of cancer. Each test has its own benefits and drawbacks. Discuss with your provider what is most appropriate for you during your annual wellness visit. The different tests include: colonoscopy (considered the best screening method), a fecal occult blood test, a fecal DNA test, and sigmoidoscopy.  -All adults born between [facility] should be screened once for Hepatitis C.  -An Abdominal Aortic Aneurysm (AAA) Screening is recommended for men age 73-68 who has ever smoked in their lifetime.      Here is a list of your current Health Maintenance items (your personalized list of preventive services) with a due date:  Health Maintenance Due   Topic Date Due    Glaucoma Screening   05/17/2019    Eye Exam  05/17/2019

## 2019-09-06 LAB
CHOLEST SERPL-MCNC: 160 MG/DL
HDLC SERPL-MCNC: 43 MG/DL (ref 40–60)
HDLC SERPL: 3.7 {RATIO} (ref 0–5)
LDLC SERPL CALC-MCNC: 99.6 MG/DL (ref 0–100)
LIPID PROFILE,FLP: NORMAL
TRIGL SERPL-MCNC: 87 MG/DL (ref ?–150)
VLDLC SERPL CALC-MCNC: 17.4 MG/DL

## 2019-09-10 ENCOUNTER — TELEPHONE (OUTPATIENT)
Dept: FAMILY MEDICINE CLINIC | Age: 74
End: 2019-09-10

## 2019-09-10 DIAGNOSIS — Z89.619 S/P AKA (ABOVE KNEE AMPUTATION) UNILATERAL (HCC): Primary | ICD-10-CM

## 2019-09-10 NOTE — TELEPHONE ENCOUNTER
Spoke with patient to clarify message. Patient needs an order for a manual wheelchair sent to [facility] [phone] and leg prosthesis order sent to [facility] or [phone]. Will inform provider. Patient verbalized understanding.

## 2019-09-10 NOTE — TELEPHONE ENCOUNTER
Patient request an order prothesis and for a manual wheelchair. He needs this to take to his medical supply store. Please call when orders ready to be picked up.

## 2019-09-11 NOTE — TELEPHONE ENCOUNTER
Spoke with Ana Kingsley at [facility] to inquire about needed information for manual wheelchair. Voxli Sancho stated that an order from the provider will start the process and the facility will fax back to our office a form with any additional information request. Inquired about fax number. SADAR 3D verbalized understanding, gave fax number [phone], and stated fax machine was down at this moment, but should be repaired by tomorrow.

## 2019-09-11 NOTE — TELEPHONE ENCOUNTER
Spoke with Audra Garcia at [facility] to inquire about needed order. Audra Garcia stated that patient requested a new socket, but that patient has not been seen in awhile, therefore, an order to see & evaluate for prosthetic prothesis is needed to inquire if any other item is needed. Will inform provider. Patient verbalized understanding.

## 2019-09-12 NOTE — TELEPHONE ENCOUNTER
Spoke with [facility] representative to confirm that fax is in working order. Representative verbalized understanding and stated that fax has been repaired.

## 2019-09-19 ENCOUNTER — TELEPHONE (OUTPATIENT)
Dept: FAMILY MEDICINE CLINIC | Age: 74
End: 2019-09-19

## 2019-09-19 NOTE — TELEPHONE ENCOUNTER
Called patient at [phone] (home)  (non-secure line) and did not leave a detailed message.  Patient needs to be informed to schedule an appointment to complete certificate of medical necessity from [address] [place] [address] for wheelchair request.

## 2019-10-02 ENCOUNTER — OFFICE VISIT (OUTPATIENT)
Dept: FAMILY MEDICINE CLINIC | Age: 74
End: 2019-10-02

## 2019-10-02 VITALS
DIASTOLIC BLOOD PRESSURE: 65 MMHG | SYSTOLIC BLOOD PRESSURE: 138 MMHG | BODY MASS INDEX: 29.62 KG/M2 | WEIGHT: 177.8 LBS | RESPIRATION RATE: 18 BRPM | OXYGEN SATURATION: 95 % | TEMPERATURE: 98.2 F | HEIGHT: 65 IN | HEART RATE: 89 BPM

## 2019-10-02 DIAGNOSIS — I73.9 PVD (PERIPHERAL VASCULAR DISEASE) (HCC): ICD-10-CM

## 2019-10-02 DIAGNOSIS — Z89.619 S/P AKA (ABOVE KNEE AMPUTATION) UNILATERAL (HCC): Primary | ICD-10-CM

## 2019-10-02 NOTE — PROGRESS NOTES
HISTORY OF PRESENT ILLNESS  Kolby Stevens is a 75 y.o. male. HPI  Patient is here today for evaluation and treatment of: Consultation For Wheel Chair    Pt is in need of a manual  wheelchair. Pt has a h/o Left AKA due to PVD. He has a mobility scooter but uses a wheelchair for inside his home. He desires a manual wheelchair. He feels well and has no new complaints      Current Outpatient Medications:     valsartan (DIOVAN) 160 mg tablet, Take 1 Tab by mouth daily. , Disp: 30 Tab, Rfl: 6    pravastatin (PRAVACHOL) 40 mg tablet, TAKE 1 TABLET BY MOUTH DAILY, Disp: 90 Tab, Rfl: 3    hydroCHLOROthiazide (MICROZIDE) 12.5 mg capsule, TAKE 1 CAPSULE BY MOUTH DAILY, Disp: 90 Cap, Rfl: 3    MULTIVITAMINS (MULTI-VITAMIN PO), Take  by mouth., Disp: , Rfl:        PMH,  Meds, Allergies, Family History, Social history reviewed            Review of Systems   Constitutional: Negative for chills and fever. Cardiovascular: Negative for chest pain and palpitations. Physical Exam   Constitutional: He appears well-developed and well-nourished. No distress. Cardiovascular: Normal rate and regular rhythm. Exam reveals no gallop and no friction rub. No murmur heard. Pulmonary/Chest: Breath sounds normal. No respiratory distress. He has no wheezes. He has no rales. Musculoskeletal: He exhibits no edema. Nursing note and vitals reviewed. Visit Vitals  /65 (BP 1 Location: Right arm, BP Patient Position: Sitting)   Pulse 89   Temp 98.2 °F (36.8 °C) (Oral)   Resp 18   Ht 5' 5\" (1.651 m)   Wt 177 lb 12.8 oz (80.6 kg)   SpO2 95%   BMI 29.59 kg/m²       ASSESSMENT and PLAN    ICD-10-CM ICD-9-CM    1. S/P AKA (above knee amputation) unilateral ([facility]) Z89.619 V49.76 AMB SUPPLY ORDER   2. PVD (peripheral vascular disease) (HCC) I73.9 443.9 AMB SUPPLY ORDER       As above, pt well and stable  DME order for wheelchair written  Follow-up and Dispositions    · Return dec 11 as scheduled.        An After Visit Summary was printed and given to the patient. This has been fully explained to the patient, who indicates understanding.

## 2019-10-02 NOTE — PROGRESS NOTES
Patient here for consultation for wheel chair. 1. Have you been to the ER, urgent care clinic since your last visit? Hospitalized since your last visit? No    2. Have you seen or consulted any other health care providers outside of the [address] [place] [address] since your last visit? Include any pap smears or colon screening.  No

## 2019-10-02 NOTE — PATIENT INSTRUCTIONS
Above-the-Knee Leg Amputation: What to Expect at [facility] Your Recovery An above-the-knee amputation is surgery to remove your leg above the knee. Your doctor removed the leg while keeping as much healthy bone, skin, blood vessel, and nerve tissue as possible. After an above-the-knee leg amputation, you will probably have bandages, a rigid dressing, or a cast over the remaining part of your leg (residual limb). The leg will be swollen for at least 4 weeks after your surgery. If you have a rigid dressing or cast, your doctor will set up regular visits to change the dressing or cast and check the healing. If you have elastic bandages, your doctor will tell you how to change them. You may have pain in your remaining limb. You also may think you have feeling or pain where your leg was. This is called phantom pain. It is common and may come and go for a year or longer. Your doctor can give you medicine for both types of pain. You may have already started a rehabilitation program (rehab). You will continue this under the guidance of your doctor or physical therapist. Marisa Chaneyr will need to do a lot of work to recondition your muscles and relearn activities, balance, and coordination. Rehab can last as long as 1 year. You may have been fitted with a temporary artificial leg while you were still in the hospital. If this is the case, your doctor will teach you how to care for it. If you are getting an artificial leg, you may need to get used to it before you return to work and your other activities. You will probably not wear it all the time, so you will need to learn how to use a wheelchair, crutches, or other device. You will have to make changes in your home. Your workplace may be able to make allowances for you. Having your leg amputated is traumatic. Learning to live with new limitations can be hard and frustrating.  You may feel depressed or grieve for your previous lifestyle. It is important to understand these feelings. Talking with your family, friends, and health professionals about your frustrations is an important part of your recovery. You may also find that it helps to talk with a person who has had an amputation. Remember that even though losing a limb is difficult, it does not change who you are or prevent you from enjoying life. You will have to adapt and learn new ways to do things, but you will still be able to work and take part in sports and activities. And you can still learn, love, play, and live life to its fullest. 
Many organizations can help you adjust to your new life. For example, you can find information at amputee-coalition.org. This care sheet gives you a general idea about how long it will take for you to recover. But each person recovers at a different pace. Follow the steps below to get better as quickly as possible. How can you care for yourself at home? Activity 
  · Be active. Talk to your doctor about what you can do. If you are active and use your remaining limb, it will heal faster.  
  · You may shower when your doctor okays it. Wash the remaining limb with soap and water, and pat it dry. You may need help doing this at first.  
  · You may need to adapt your car to your situation before you drive.  
  · You will probably be able to return to work and your usual routine when your remaining limb heals. This can be as soon as 4 to 8 weeks after surgery, but it may take longer. Diet 
  · You can eat your normal diet. If your stomach is upset, try bland, low-fat foods like plain rice, broiled chicken, toast, and yogurt.  
  · You may notice that your bowel movements are not regular right after your surgery. This is common. Try to avoid constipation and straining with bowel movements. Take a fiber supplement every day.  If you have not had a bowel movement after a couple of days, ask your doctor about taking a mild laxative. Medicines 
  · Your doctor will tell you if and when you can restart your medicines. He or she will also give you instructions about taking any new medicines.  
  · If you take blood thinners, such as warfarin (Coumadin), clopidogrel (Plavix), or aspirin, be sure to talk to your doctor. He or she will tell you if and when to start taking those medicines again. Make sure that you understand exactly what your doctor wants you to do.  
  · Take pain medicines exactly as directed. ? If the doctor gave you a prescription medicine for pain, take it as prescribed. ? If you are not taking a prescription pain medicine, ask your doctor if you can take an over-the-counter medicine.  
  · If you think your pain medicine is making you sick to your stomach: 
? Take your medicine after meals (unless your doctor has told you not to). ? Ask your doctor for a different pain medicine.  
  · If your doctor prescribed antibiotics, take them as directed. Do not stop taking them just because you feel better. You need to take the full course of antibiotics.  
 Remaining limb care 
  · You may have bandages, a rigid dressing, or a cast on your remaining limb. Your doctor will tell you how to take care of it. Depending on your dressing and the doctor's instructions: 
? Check your remaining limb daily for irritation, skin breaks, and redness. Tell your doctor about any problems you see. ? Wash your remaining limb with mild soap and warm water every night. Pat it dry.  
  · If you have a temporary artificial leg, remove it before you go to sleep. Exercise 
  · Rehabilitation is a series of exercises you do after your surgery. This helps you learn to use your remaining limb and artificial leg. You will work with your doctor and physical therapist to plan this exercise program. To get the best results, you need to do the exercises correctly and as often and as long as your doctor tells you.  Your rehab program will give you a number of exercises to do. Always do them as your therapist tells you. Other instructions 
  · Preventing contractures is very important. A contracture occurs when a joint becomes stuck in one position. If this happens, it may be hard or impossible to straighten your remaining limb and use an artificial leg. 
? Make sure you put equal weight on both hips when you sit. Use firm chairs, and sit up straight. ? Keep your remaining limb flat with your legs together while you are lying on your back. ? Lie on your stomach as much as possible to stretch your hip joint. ? Do not sit for more than an hour or two. Stand, or lie on your stomach now and then. ? Do not put pillows under your hips or knees or between your thighs. Follow-up care is a key part of your treatment and safety. Be sure to make and go to all appointments, and call your doctor if you are having problems. It's also a good idea to know your test results and keep a list of the medicines you take. When should you call for help? Call 911 anytime you think you may need emergency care. For example, call if: 
  · You passed out (lost consciousness).  
  · You have chest pain, are short of breath, or you cough up blood.  
 Call your doctor now or seek immediate medical care if: 
  · You have pain that does not get better after you take pain medicine.  
  · You are sick to your stomach or cannot drink fluids.  
  · You have loose stitches, or your incision comes open.  
  · You have signs of a blood clot in your leg (called a deep vein thrombosis), such as: 
? Pain in your calf, back of the knee, thigh, or groin. ? Redness or swelling in your leg.  
  · You have signs of infection, such as: 
? Increased pain, swelling, warmth, or redness. ? Red streaks leading from the incision. ? Pus draining from the incision. ?  A fever.  
  · You bleed through your bandage.  
 Watch closely for any changes in your health, and be sure to contact your doctor if you have any problems. Where can you learn more? Go to http://carleen-harjinder.info/. Enter P582 in the search box to learn more about \"Above-the-Knee Leg Amputation: What to Expect at Home. \" Current as of: June 26, 2019 Content Version: 12.2 © 9368-0922 [employer]. Care instructions adapted under license by [employer] (which disclaims liability or warranty for this information). If you have questions about a medical condition or this instruction, always ask your healthcare professional. [address] any warranty or liability for your use of this information.

## 2020-01-02 ENCOUNTER — TELEPHONE (OUTPATIENT)
Dept: FAMILY MEDICINE CLINIC | Age: 75
End: 2020-01-02

## 2020-01-02 NOTE — TELEPHONE ENCOUNTER
Pt request return call re: order for prosthetic for left leg above the knee     [employer] has faxed request several times to our office

## 2020-01-07 NOTE — TELEPHONE ENCOUNTER
Requested Prescriptions     Pending Prescriptions Disp Refills    valsartan (DIOVAN) 160 mg tablet 30 Tab 6     Sig: Take 1 Tab by mouth daily.  pravastatin (PRAVACHOL) 40 mg tablet 90 Tab 3     Sig: TAKE 1 TABLET BY MOUTH DAILY    hydroCHLOROthiazide (MICROZIDE) 12.5 mg capsule 90 Cap 3     Sig: TAKE 1 CAPSULE BY MOUTH DAILY     Patient has an appointment on 1/28/20 but out of medications.

## 2020-01-09 RX ORDER — VALSARTAN 160 MG/1
160 TABLET ORAL DAILY
Qty: 30 TAB | Refills: 6 | Status: SHIPPED | OUTPATIENT
Start: 2020-01-09 | End: 2020-07-27 | Stop reason: SDUPTHER

## 2020-01-09 RX ORDER — HYDROCHLOROTHIAZIDE 12.5 MG/1
CAPSULE ORAL
Qty: 90 CAP | Refills: 3 | Status: SHIPPED | OUTPATIENT
Start: 2020-01-09 | End: 2021-01-06 | Stop reason: SDUPTHER

## 2020-01-09 RX ORDER — PRAVASTATIN SODIUM 40 MG/1
TABLET ORAL
Qty: 90 TAB | Refills: 3 | Status: SHIPPED | OUTPATIENT
Start: 2020-01-09 | End: 2021-01-06 | Stop reason: SDUPTHER

## 2020-01-14 NOTE — TELEPHONE ENCOUNTER
Spoke with Edilma Yeboah at [facility] to inquire about needs for order. Edilma Yeboah stated form needs provider signature and office note needs to state the need for the socket. Will inform provider and an appointment maybe needed. Edilma Yeboah verbalized understanding.

## 2020-01-15 NOTE — TELEPHONE ENCOUNTER
Spoke with patient to inform that an appointment is needed to have medical documentation pertaining to needed socket for prothesis for [address] Patient verbalized understanding and stated that patient had an upcoming appointment already.

## 2020-01-15 NOTE — TELEPHONE ENCOUNTER
Spoke with Jonathan Perrin to inform that patient has an upcoming appointment scheduled on 01/28/2020 and that documentation will be addressed at that time. Will fax information once complete. Jonathan Perrin verbalized understanding.

## 2020-01-28 ENCOUNTER — OFFICE VISIT (OUTPATIENT)
Dept: FAMILY MEDICINE CLINIC | Age: 75
End: 2020-01-28

## 2020-01-28 ENCOUNTER — HOSPITAL ENCOUNTER (OUTPATIENT)
Dept: LAB | Age: 75
Discharge: HOME | End: 2020-01-28
Payer: MEDICARE

## 2020-01-28 VITALS
DIASTOLIC BLOOD PRESSURE: 68 MMHG | HEIGHT: 65 IN | BODY MASS INDEX: 28.82 KG/M2 | TEMPERATURE: 98.1 F | SYSTOLIC BLOOD PRESSURE: 142 MMHG | OXYGEN SATURATION: 96 % | HEART RATE: 96 BPM | WEIGHT: 173 LBS | RESPIRATION RATE: 18 BRPM

## 2020-01-28 DIAGNOSIS — Z89.619 S/P AKA (ABOVE KNEE AMPUTATION) UNILATERAL (HCC): ICD-10-CM

## 2020-01-28 DIAGNOSIS — E78.00 HYPERCHOLESTEROLEMIA: ICD-10-CM

## 2020-01-28 DIAGNOSIS — E11.65 TYPE 2 DIABETES MELLITUS WITH HYPERGLYCEMIA, WITH LONG-TERM CURRENT USE OF INSULIN (HCC): Primary | ICD-10-CM

## 2020-01-28 DIAGNOSIS — E11.65 TYPE 2 DIABETES MELLITUS WITH HYPERGLYCEMIA, WITH LONG-TERM CURRENT USE OF INSULIN (HCC): ICD-10-CM

## 2020-01-28 DIAGNOSIS — Z79.4 TYPE 2 DIABETES MELLITUS WITH HYPERGLYCEMIA, WITH LONG-TERM CURRENT USE OF INSULIN (HCC): Primary | ICD-10-CM

## 2020-01-28 DIAGNOSIS — Z79.4 TYPE 2 DIABETES MELLITUS WITH HYPERGLYCEMIA, WITH LONG-TERM CURRENT USE OF INSULIN (HCC): ICD-10-CM

## 2020-01-28 DIAGNOSIS — I10 HTN (HYPERTENSION), BENIGN: ICD-10-CM

## 2020-01-28 LAB
ALT SERPL-CCNC: 32 U/L (ref 16–61)
ANION GAP SERPL CALC-SCNC: 3 MMOL/L (ref 3–18)
AST SERPL-CCNC: 20 U/L (ref 10–38)
BUN SERPL-MCNC: 17 MG/DL (ref 7–18)
BUN/CREAT SERPL: 16 (ref 12–20)
CALCIUM SERPL-MCNC: 10.5 MG/DL (ref 8.5–10.1)
CHLORIDE SERPL-SCNC: 107 MMOL/L (ref 100–111)
CHOLEST SERPL-MCNC: 164 MG/DL
CO2 SERPL-SCNC: 32 MMOL/L (ref 21–32)
CREAT SERPL-MCNC: 1.04 MG/DL (ref 0.6–1.3)
CREAT UR-MCNC: 146 MG/DL (ref 30–125)
EST. AVERAGE GLUCOSE BLD GHB EST-MCNC: 140 MG/DL
GLUCOSE SERPL-MCNC: 128 MG/DL (ref 74–99)
HBA1C MFR BLD: 6.5 % (ref 4.2–5.6)
HDLC SERPL-MCNC: 49 MG/DL (ref 40–60)
HDLC SERPL: 3.3 {RATIO} (ref 0–5)
LDLC SERPL CALC-MCNC: 100.2 MG/DL (ref 0–100)
LIPID PROFILE,FLP: ABNORMAL
MICROALBUMIN UR-MCNC: 3.53 MG/DL (ref 0–3)
MICROALBUMIN/CREAT UR-RTO: 24 MG/G (ref 0–30)
POTASSIUM SERPL-SCNC: 4.7 MMOL/L (ref 3.5–5.5)
SODIUM SERPL-SCNC: 142 MMOL/L (ref 136–145)
TRIGL SERPL-MCNC: 74 MG/DL (ref ?–150)
VLDLC SERPL CALC-MCNC: 14.8 MG/DL

## 2020-01-28 PROCEDURE — 80061 LIPID PANEL: CPT

## 2020-01-28 PROCEDURE — 36415 COLL VENOUS BLD VENIPUNCTURE: CPT

## 2020-01-28 PROCEDURE — 84460 ALANINE AMINO (ALT) (SGPT): CPT

## 2020-01-28 PROCEDURE — 84450 TRANSFERASE (AST) (SGOT): CPT

## 2020-01-28 PROCEDURE — 80048 BASIC METABOLIC PNL TOTAL CA: CPT

## 2020-01-28 PROCEDURE — 83036 HEMOGLOBIN GLYCOSYLATED A1C: CPT

## 2020-01-28 PROCEDURE — 82043 UR ALBUMIN QUANTITATIVE: CPT

## 2020-01-28 NOTE — PATIENT INSTRUCTIONS
DASH Diet: Care Instructions Your Care Instructions The DASH diet is an eating plan that can help lower your blood pressure. DASH stands for Dietary Approaches to Stop Hypertension. Hypertension is high blood pressure. The DASH diet focuses on eating foods that are high in calcium, potassium, and magnesium. These nutrients can lower blood pressure. The foods that are highest in these nutrients are fruits, vegetables, low-fat dairy products, nuts, seeds, and legumes. But taking calcium, potassium, and magnesium supplements instead of eating foods that are high in those nutrients does not have the same effect. The DASH diet also includes whole grains, fish, and poultry. The DASH diet is one of several lifestyle changes your doctor may recommend to lower your high blood pressure. Your doctor may also want you to decrease the amount of sodium in your diet. Lowering sodium while following the DASH diet can lower blood pressure even further than just the DASH diet alone. Follow-up care is a key part of your treatment and safety. Be sure to make and go to all appointments, and call your doctor if you are having problems. It's also a good idea to know your test results and keep a list of the medicines you take. How can you care for yourself at home? Following the DASH diet · Eat 4 to 5 servings of fruit each day. A serving is 1 medium-sized piece of fruit, ½ cup chopped or canned fruit, 1/4 cup dried fruit, or 4 ounces (½ cup) of fruit juice. Choose fruit more often than fruit juice. · Eat 4 to 5 servings of vegetables each day. A serving is 1 cup of lettuce or raw leafy vegetables, ½ cup of chopped or cooked vegetables, or 4 ounces (½ cup) of vegetable juice. Choose vegetables more often than vegetable juice. · Get 2 to 3 servings of low-fat and fat-free dairy each day. A serving is 8 ounces of milk, 1 cup of yogurt, or 1 ½ ounces of cheese. · Eat 6 to 8 servings of grains each day. A serving is 1 slice of bread, 1 ounce of dry cereal, or ½ cup of cooked rice, pasta, or cooked cereal. Try to choose whole-grain products as much as possible. · Limit lean meat, poultry, and fish to 2 servings each day. A serving is 3 ounces, about the size of a deck of cards. · Eat 4 to 5 servings of nuts, seeds, and legumes (cooked dried beans, lentils, and split peas) each week. A serving is 1/3 cup of nuts, 2 tablespoons of seeds, or ½ cup of cooked beans or peas. · Limit fats and oils to 2 to 3 servings each day. A serving is 1 teaspoon of vegetable oil or 2 tablespoons of salad dressing. · Limit sweets and added sugars to 5 servings or less a week. A serving is 1 tablespoon jelly or jam, ½ cup sorbet, or 1 cup of lemonade. · Eat less than 2,300 milligrams (mg) of sodium a day. If you limit your sodium to 1,500 mg a day, you can lower your blood pressure even more. Tips for success · Start small. Do not try to make dramatic changes to your diet all at once. You might feel that you are missing out on your favorite foods and then be more likely to not follow the plan. Make small changes, and stick with them. Once those changes become habit, add a few more changes. · Try some of the following: ? Make it a goal to eat a fruit or vegetable at every meal and at snacks. This will make it easy to get the recommended amount of fruits and vegetables each day. ? Try yogurt topped with fruit and nuts for a snack or healthy dessert. ? Add lettuce, tomato, cucumber, and onion to sandwiches. ? Combine a ready-made pizza crust with low-fat mozzarella cheese and lots of vegetable toppings. Try using tomatoes, squash, spinach, broccoli, carrots, cauliflower, and onions. ? Have a variety of cut-up vegetables with a low-fat dip as an appetizer instead of chips and dip. ? Sprinkle sunflower seeds or chopped almonds over salads.  Or try adding chopped walnuts or almonds to cooked vegetables. ? Try some vegetarian meals using beans and peas. Add garbanzo or kidney beans to salads. Make burritos and tacos with mashed arzate beans or black beans. Where can you learn more? Go to http://carleen-harjinder.info/. Enter N896 in the search box to learn more about \"DASH Diet: Care Instructions. \" Current as of: April 9, 2019 Content Version: 12.2 © 4487-8380 [employer]. Care instructions adapted under license by [employer] (which disclaims liability or warranty for this information). If you have questions about a medical condition or this instruction, always ask your healthcare professional. [address] any warranty or liability for your use of this information.

## 2020-01-28 NOTE — PROGRESS NOTES
HISTORY OF PRESENT ILLNESS  Shalom Germain is a 75 y.o. male. HPI  Patient is here today for evaluation and treatment of AKA( Left)    Pt has a left AKA. Pt has a prothesis. This prosthesis is split on the side and loses suction. This causes the prosthesis to slide and causes instability. He needs a new socket. It is medically necessary for  the socket to be replaced. Pt has HTN, DM and Cholesterol. Pt is on meds as listed below. He manages his DM with diet. He is on diovan and pravachol. He attempts a lower cholesterol diet. He has not been to eye MD.  Advised pt to set up his own appointment as this is what he desires. Pt has not eaten today. He has no other complaints at this time. Lab Results   Component Value Date/Time    Hemoglobin A1c 6.8 (H) 09/05/2019 11:31 AM             Current Outpatient Medications:     valsartan (DIOVAN) 160 mg tablet, Take 1 Tab by mouth daily. , Disp: 30 Tab, Rfl: 6    pravastatin (PRAVACHOL) 40 mg tablet, TAKE 1 TABLET BY MOUTH DAILY, Disp: 90 Tab, Rfl: 3    hydroCHLOROthiazide (MICROZIDE) 12.5 mg capsule, TAKE 1 CAPSULE BY MOUTH DAILY, Disp: 90 Cap, Rfl: 3    MULTIVITAMINS (MULTI-VITAMIN PO), Take  by mouth., Disp: , Rfl:       PMH,  Meds, Allergies, Family History, Social history reviewed    Review of Systems   Constitutional: Negative for chills and fever. Respiratory: Negative for shortness of breath. Cardiovascular: Negative for chest pain and palpitations.        Physical Exam   Visit Vitals  /68   Pulse 96   Temp 98.1 °F (36.7 °C) (Oral)   Resp 18   Ht 5' 5\" (1.651 m)   Wt 173 lb (78.5 kg)   SpO2 96%   BMI 28.79 kg/m²     General appearance: alert, cooperative, no distress, appears stated age  Neck: supple, symmetrical, trachea midline, no adenopathy, thyroid: not enlarged, symmetric, no tenderness/mass/nodules, no carotid bruit and no JVD  Lungs: clear to auscultation bilaterally  Heart: regular rate and rhythm, S1, S2 normal, no murmur, click, rub or gallop  Extremities: extremities normal, atraumatic, no cyanosis or edema    Lab Results   Component Value Date/Time    Cholesterol, total 160 09/05/2019 11:31 AM    HDL Cholesterol 43 09/05/2019 11:31 AM    LDL, calculated 99.6 09/05/2019 11:31 AM    VLDL, calculated 17.4 09/05/2019 11:31 AM    Triglyceride 87 09/05/2019 11:31 AM    CHOL/HDL Ratio 3.7 09/05/2019 11:31 AM     Lab Results   Component Value Date/Time    Sodium 141 09/05/2019 11:31 AM    Potassium 4.1 09/05/2019 11:31 AM    Chloride 107 09/05/2019 11:31 AM    CO2 28 09/05/2019 11:31 AM    Anion gap 6 09/05/2019 11:31 AM    Glucose 110 (H) 09/05/2019 11:31 AM    BUN 18 09/05/2019 11:31 AM    Creatinine 0.95 09/05/2019 11:31 AM    BUN/Creatinine ratio 19 09/05/2019 11:31 AM    GFR est AA >60 09/05/2019 11:31 AM    GFR est non-AA >60 09/05/2019 11:31 AM    Calcium 9.9 09/05/2019 11:31 AM     Lab Results   Component Value Date/Time    Hemoglobin A1c 6.8 (H) 09/05/2019 11:31 AM       ASSESSMENT and PLAN    ICD-10-CM ICD-9-CM    1. Type 2 diabetes mellitus with hyperglycemia, with long-term current use of insulin ([facility]) E11.65 250.00 MICROALBUMIN, UR, RAND W/ MICROALB/CREAT RATIO    Z79.4 790.29 HEMOGLOBIN A1C WITH EAG     V58.67    2. Hypercholesterolemia E78.00 272.0 LIPID PANEL      AST      ALT   3. HTN (hypertension), benign X93 991.3 METABOLIC PANEL, BASIC   4. S/P AKA (above knee amputation) unilateral ([facility] [address] Z89.619 V49.76        As above,  treatment plan as listed below  Orders Placed This Encounter    MICROALBUMIN, UR, RAND W/ MICROALB/CREAT RATIO    METABOLIC PANEL, BASIC    LIPID PANEL    AST    ALT    HEMOGLOBIN A1C WITH EAG     Follow-up and Dispositions    · Return in about 4 months (around 5/28/2020) for htn, Chol, diabetes. An After Visit Summary was printed and given to the patient. This has been fully explained to the patient, who indicates understanding.

## 2020-01-28 NOTE — PROGRESS NOTES
Identified pt with two pt identifiers(name and ). Reviewed record in preparation for visit and have obtained necessary documentation. Chief Complaint   Patient presents with    Form Completion    Hypertension     follow up    Diabetes        Health Maintenance Due   Topic    Shingrix Vaccine Age 50> (1 of 2)    FOOT EXAM Q1     GLAUCOMA SCREENING Q2Y     EYE EXAM RETINAL OR DILATED     Influenza Age 5 to Adult     MICROALBUMIN Q1        Coordination of Care Questionnaire:  :   1) Have you been to an emergency room, urgent care, or hospitalized since your last visit? If yes, where when, and reason for visit? no       2. Have seen or consulted any other health care provider since your last visit? If yes, where when, and reason for visit? NO      3) Do you have an Advanced Directive/ Living Will in place? NO        Learning Assessment 2013   PRIMARY LEARNER Patient   PRIMARY LANGUAGE ENGLISH   LEARNER PREFERENCE PRIMARY LISTENING   ANSWERED BY patient   RELATIONSHIP SELF        3 most recent PHQ Screens 2020   Little interest or pleasure in doing things Not at all   Feeling down, depressed, irritable, or hopeless Not at all   Total Score PHQ 2 0        Abuse Screening Questionnaire 4/3/2018   Do you ever feel afraid of your partner? N   Are you in a relationship with someone who physically or mentally threatens you? N   Is it safe for you to go home? Y        Fall Risk Assessment, last 12 mths 2020   Able to walk? Yes   Fall in past 12 months?  No

## 2020-04-06 NOTE — PROGRESS NOTES
1. Have you been to the ER, urgent care clinic since your last visit? Hospitalized since your last visit? No    2. Have you seen or consulted any other health care providers outside of the [address] [place] [address] since your last visit? Include any pap smears or colon screening.  No negative

## 2020-07-27 NOTE — TELEPHONE ENCOUNTER
Last Visit: 1/28/20 with MD Bejarano Fraction  Next Appointment: none  Previous Refill Encounter(s): 1/9/20 #30 with 6 refills    Requested Prescriptions     Pending Prescriptions Disp Refills    valsartan (DIOVAN) 160 mg tablet 30 Tab 6     Sig: Take 1 Tab by mouth daily.

## 2020-07-29 RX ORDER — VALSARTAN 160 MG/1
160 TABLET ORAL DAILY
Qty: 30 TAB | Refills: 6 | Status: SHIPPED | OUTPATIENT
Start: 2020-07-29 | End: 2021-02-24

## 2020-09-03 ENCOUNTER — VIRTUAL VISIT (OUTPATIENT)
Dept: FAMILY MEDICINE CLINIC | Age: 75
End: 2020-09-03

## 2020-09-03 DIAGNOSIS — I73.9 PVD (PERIPHERAL VASCULAR DISEASE) (HCC): ICD-10-CM

## 2020-09-03 DIAGNOSIS — Z00.00 MEDICARE ANNUAL WELLNESS VISIT, SUBSEQUENT: Primary | ICD-10-CM

## 2020-09-03 NOTE — PROGRESS NOTES
This is the Subsequent Medicare Annual Wellness Exam, performed 12 months or more after the Initial AWV or the last Subsequent AWV    I have reviewed the patient's medical history in detail and updated the computerized patient record. Pt has been well;  BPs have been stable    He has no new complaints     Pt has had PVD- this is stable        Lab Results   Component Value Date/Time    Hemoglobin A1c 6.5 (H) 01/28/2020 01:35 PM         History     Patient Active Problem List   Diagnosis Code    Gout M10.9    [facility]VD [facility]peripheral vascular disease) ([facility]) I73.9    Hypercholesterolemia E78.00    Arthritis M19.90    Cataract H26.9    DM (diabetes mellitus) ([facility] [address] E11.9    HTN (hypertension), benign I10    Advanced directives, counseling/discussion Z71.89    Type 2 diabetes with nephropathy ([facility] [address] E11.21     Past Medical History:   Diagnosis Date    Arthritis     hands    Cataract     left eye    Diabetes ([facility] [address]     diet controlled    DM (diabetes mellitus) ([facility] [address] 2/3/2010    HTN (hypertension), benign     Hypercholesterolemia     PVD (peripheral vascular disease) ([facility] [address] 2/3/2010      Past Surgical History:   Procedure Laterality Date    CARDIAC SURG PROCEDURE UNLIST  1995    left fem/pop bypass    CARDIAC SURG PROCEDURE UNLIST  1997    occlusion bypass angiographic thrombolysis failed    CARDIAC SURG PROCEDURE UNLIST      s/p left illiac/pop    HX AMPUTATION      left leg aka prosthesis due to PVD     Current Outpatient Medications   Medication Sig Dispense Refill    valsartan (DIOVAN) 160 mg tablet Take 1 Tab by mouth daily. 30 Tab 6    pravastatin (PRAVACHOL) 40 mg tablet TAKE 1 TABLET BY MOUTH DAILY 90 Tab 3    hydroCHLOROthiazide (MICROZIDE) 12.5 mg capsule TAKE 1 CAPSULE BY MOUTH DAILY 90 Cap 3    MULTIVITAMINS (MULTI-VITAMIN PO) Take  by mouth.        No Known Allergies     Family History   Problem Relation Age of Onset    Hypertension Father      Social History     Tobacco Use    Smoking status: Current Every Day Smoker     Packs/day: 0.50     Years: 59.00     Pack years: 29.50     Types: Cigarettes     Start date: 1961    Smokeless tobacco: Never Used   Substance Use Topics    Alcohol use: No       Depression Risk Factor Screening:     3 most recent PHQ Screens 1/28/2020   Little interest or pleasure in doing things Not at all   Feeling down, depressed, irritable, or hopeless Not at all   Total Score PHQ 2 0       Alcohol Risk Factor Screening (MALE > 65): Do you average more 1 drink per night or more than 7 drinks a week: No    In the past three months have you have had more than 4 drinks containing alcohol on one occasion: No      Functional Ability and Level of Safety:   Hearing: Hearing is good. Activities of Daily Living: The home contains: wheeled scooter  Patient does total self care     Ambulation: uses a wheeled scooter     Fall Risk:  Fall Risk Assessment, last 12 mths 1/28/2020   Able to walk? Yes   Fall in past 12 months? No     Abuse Screen:  Patient is not abused       Cognitive Screening   Has your family/caregiver stated any concerns about your memory: no    Cognitive Screening: cognition intact    Patient Care Team   Patient Care Team:  Ruddy Tirado MD as PCP - Tonja Mendiola MD as PCP - [facility]  Bossman Calvo MD (Podiatry)  Flor Moore MD (Ophthalmology)    Assessment/Plan   Education and counseling provided:  Are appropriate based on today's review and evaluation    Diagnoses and all orders for this visit:    1. Medicare annual wellness visit, subsequent    2. PVD (peripheral vascular disease) ([facility] [address]      As above, pt is well and stable   treatment plan as listed below  Follow-up and Dispositions    · Return in about 3 months (around 12/3/2020) for diabetes, htn. This has been fully explained to the patient, who indicates understanding. Shante Michelle       Health Maintenance Due   Topic Date Due    Shingrix Vaccine Age 50> (1 of 2) 06/07/1994    Pneumococcal 65+ years (1 of 1 - PPSV23) 06/07/2009    Foot Exam Q1  07/12/2018    GLAUCOMA SCREENING Q2Y  05/17/2019    Eye Exam Retinal or Dilated  05/17/2019    A1C test (Diabetic or Prediabetic)  07/28/2020    Flu Vaccine (1) 09/01/2020       Gabrielle Turpin, who was evaluated through a synchronous (real-time) audio only encounter, and/or his healthcare decision maker, is aware that it is a billable service, with coverage as determined by his insurance carrier. He provided verbal consent to proceed: Yes, and patient identification was verified. It was conducted pursuant to the emergency declaration under the [address] [place] [address] authority and the [employer] General Act. A caregiver was present when appropriate. Ability to conduct physical exam was limited. I was in the office. The patient was at home.     Brie Guadarrama MD

## 2020-09-03 NOTE — PATIENT INSTRUCTIONS
Medicare Wellness Visit, Male The best way to live healthy is to have a lifestyle where you eat a well-balanced diet, exercise regularly, limit alcohol use, and quit all forms of tobacco/nicotine, if applicable. Regular preventive services are another way to keep healthy. Preventive services (vaccines, screening tests, monitoring & exams) can help personalize your care plan, which helps you manage your own care. Screening tests can find health problems at the earliest stages, when they are easiest to treat. [facility] follows the current, evidence-based guidelines published by the [facility] ([facility]) when recommending preventive services for our patients. Because we follow these guidelines, sometimes recommendations change over time as research supports it. (For example, a prostate screening blood test is no longer routinely recommended for men with no symptoms). Of course, you and your doctor may decide to screen more often for some diseases, based on your risk and co-morbidities (chronic disease you are already diagnosed with). Preventive services for you include: - Medicare offers their members a free annual wellness visit, which is time for you and your primary care provider to discuss and plan for your preventive service needs. Take advantage of this benefit every year! 
-All adults over age 72 should receive the recommended pneumonia vaccines. Current USPSTF guidelines recommend a series of two vaccines for the best pneumonia protection.  
-All adults should have a flu vaccine yearly and tetanus vaccine every 10 years. 
-All adults age 48 and older should receive the shingles vaccines (series of two vaccines).       
-All adults age 38-68 who are overweight should have a diabetes screening test once every three years.  
-Other screening tests & preventive services for persons with diabetes include: an eye exam to screen for diabetic retinopathy, a kidney function test, a foot exam, and stricter control over your cholesterol.  
-Cardiovascular screening for adults with routine risk involves an electrocardiogram (ECG) at intervals determined by the provider.  
-Colorectal cancer screening should be done for adults age 54-65 with no increased risk factors for colorectal cancer. There are a number of acceptable methods of screening for this type of cancer. Each test has its own benefits and drawbacks. Discuss with your provider what is most appropriate for you during your annual wellness visit. The different tests include: colonoscopy (considered the best screening method), a fecal occult blood test, a fecal DNA test, and sigmoidoscopy. 
-All adults born between [facility] should be screened once for Hepatitis C. 
-An Abdominal Aortic Aneurysm (AAA) Screening is recommended for men age 73-68 who has ever smoked in their lifetime. Here is a list of your current Health Maintenance items (your personalized list of preventive services) with a due date: 
Health Maintenance Due Topic Date Due  Shingles Vaccine (1 of 2) 06/07/1994  Pneumococcal Vaccine (1 of 1 - PPSV23) 06/07/2009  Diabetic Foot Care  07/12/2018  Glaucoma Screening   05/17/2019 [facility] Eye Exam  05/17/2019  Hemoglobin A1C    07/28/2020  Yearly Flu Vaccine (1) 09/01/2020

## 2021-01-06 ENCOUNTER — VIRTUAL VISIT (OUTPATIENT)
Dept: FAMILY MEDICINE CLINIC | Age: 76
End: 2021-01-06
Payer: MEDICARE

## 2021-01-06 DIAGNOSIS — Z79.4 TYPE 2 DIABETES MELLITUS WITH HYPERGLYCEMIA, WITH LONG-TERM CURRENT USE OF INSULIN (HCC): ICD-10-CM

## 2021-01-06 DIAGNOSIS — E11.65 TYPE 2 DIABETES MELLITUS WITH HYPERGLYCEMIA, WITH LONG-TERM CURRENT USE OF INSULIN (HCC): ICD-10-CM

## 2021-01-06 DIAGNOSIS — I10 HTN (HYPERTENSION), BENIGN: Primary | ICD-10-CM

## 2021-01-06 DIAGNOSIS — E78.00 HYPERCHOLESTEROLEMIA: ICD-10-CM

## 2021-01-06 PROCEDURE — G8536 NO DOC ELDER MAL SCRN: HCPCS | Performed by: FAMILY MEDICINE

## 2021-01-06 PROCEDURE — G8432 DEP SCR NOT DOC, RNG: HCPCS | Performed by: FAMILY MEDICINE

## 2021-01-06 PROCEDURE — G8427 DOCREV CUR MEDS BY ELIG CLIN: HCPCS | Performed by: FAMILY MEDICINE

## 2021-01-06 PROCEDURE — G0463 HOSPITAL OUTPT CLINIC VISIT: HCPCS | Performed by: FAMILY MEDICINE

## 2021-01-06 PROCEDURE — G8419 CALC BMI OUT NRM PARAM NOF/U: HCPCS | Performed by: FAMILY MEDICINE

## 2021-01-06 PROCEDURE — 1101F PT FALLS ASSESS-DOCD LE1/YR: CPT | Performed by: FAMILY MEDICINE

## 2021-01-06 PROCEDURE — 99442 PR PHYS/QHP TELEPHONE EVALUATION 11-20 MIN: CPT | Performed by: FAMILY MEDICINE

## 2021-01-06 PROCEDURE — G8756 NO BP MEASURE DOC: HCPCS | Performed by: FAMILY MEDICINE

## 2021-01-06 RX ORDER — PRAVASTATIN SODIUM 40 MG/1
TABLET ORAL
Qty: 90 TAB | Refills: 3 | Status: SHIPPED | OUTPATIENT
Start: 2021-01-06 | End: 2021-11-13

## 2021-01-06 RX ORDER — HYDROCHLOROTHIAZIDE 12.5 MG/1
CAPSULE ORAL
Qty: 90 CAP | Refills: 3 | Status: SHIPPED | OUTPATIENT
Start: 2021-01-06 | End: 2022-01-12 | Stop reason: SDUPTHER

## 2021-01-06 NOTE — PROGRESS NOTES
Mecca Montilla is a 67 y.o. male, evaluated via audio-only technology on 1/6/2021 for Hypertension and Cholesterol Problem  . Assessment & Plan:   Diagnoses and all orders for this visit:    1. Type 2 diabetes mellitus with hyperglycemia, with long-term current use of insulin (HCC)  -     HEMOGLOBIN A1C WITH EAG; Future    2. Hypercholesterolemia  -     LIPID PANEL; Future    3. HTN (hypertension), benign  -     METABOLIC PANEL, COMPREHENSIVE; Future    Other orders  -     pravastatin (PRAVACHOL) 40 mg tablet; TAKE 1 TABLET BY MOUTH DAILY  -     hydroCHLOROthiazide (MICROZIDE) 12.5 mg capsule; TAKE 1 CAPSULE BY MOUTH DAILY      As above, he  is well    above all stable unless otherwise noted   treatment plan as listed below  Orders Placed This Encounter    METABOLIC PANEL, COMPREHENSIVE    LIPID PANEL    HEMOGLOBIN A1C WITH EAG    pravastatin (PRAVACHOL) 40 mg tablet    hydroCHLOROthiazide (MICROZIDE) 12.5 mg capsule     Follow-up and Dispositions    · Return in about 4 months (around 5/6/2021) for diabetes, Chol, htn. This has been fully explained to the patient, who indicates understanding. 12as above  Subjective:     Here to follow up on HTN and Cholesterol. He is on meds as listed below; He tolerates med well;   Needs refills  Needs labs    His diabetes is managed by diet.       Lab Results   Component Value Date/Time    Cholesterol, total 164 01/28/2020 01:35 PM    HDL Cholesterol 49 01/28/2020 01:35 PM    LDL, calculated 100.2 (H) 01/28/2020 01:35 PM    VLDL, calculated 14.8 01/28/2020 01:35 PM    Triglyceride 74 01/28/2020 01:35 PM    CHOL/HDL Ratio 3.3 01/28/2020 01:35 PM     Lab Results   Component Value Date/Time    Sodium 142 01/28/2020 01:35 PM    Potassium 4.7 01/28/2020 01:35 PM    Chloride 107 01/28/2020 01:35 PM    CO2 32 01/28/2020 01:35 PM    Anion gap 3 01/28/2020 01:35 PM    Glucose 128 (H) 01/28/2020 01:35 PM    BUN 17 01/28/2020 01:35 PM    Creatinine 1.04 01/28/2020 01:35 PM    BUN/Creatinine ratio 16 01/28/2020 01:35 PM    GFR est AA >60 01/28/2020 01:35 PM    GFR est non-AA >60 01/28/2020 01:35 PM    Calcium 10.5 (H) 01/28/2020 01:35 PM         Patient Active Problem List    Diagnosis Date Noted    Type 2 diabetes with nephropathy ([facility] [address] 04/18/2019    Advanced directives, counseling/discussion 12/15/2015    HTN (hypertension), benign 10/21/2010    Gout 02/03/2010    PVD (peripheral vascular disease) ([facility] [address] 02/03/2010    DM (diabetes mellitus) ([facility] [address] 02/03/2010    Hypercholesterolemia     Arthritis     Cataract      No Known Allergies  Past Medical History:   Diagnosis Date    Arthritis     hands    Cataract     left eye    Diabetes ([facility] [address]     diet controlled    DM (diabetes mellitus) ([facility] [address] 2/3/2010    HTN (hypertension), benign     Hypercholesterolemia     PVD (peripheral vascular disease) ([facility] [address] 2/3/2010     Past Surgical History:   Procedure Laterality Date    CARDIAC SURG PROCEDURE UNLIST  1995    left fem/pop bypass    CARDIAC SURG PROCEDURE UNLIST  1997    occlusion bypass angiographic thrombolysis failed    CARDIAC SURG PROCEDURE UNLIST      s/p left illiac/pop    HX AMPUTATION      left leg aka prosthesis due to PVD     Family History   Problem Relation Age of Onset    Hypertension Father      Social History     Tobacco Use    Smoking status: Current Every Day Smoker     Packs/day: 0.50     Years: 59.00     Pack years: 29.50     Types: Cigarettes     Start date: 1961    Smokeless tobacco: Never Used   Substance Use Topics    Alcohol use: No       Review of Systems   Constitutional: Negative for chills and fever. Respiratory: Negative for shortness of breath and wheezing. Cardiovascular: Negative for chest pain and palpitations. No flowsheet data found.      Toño Gonzalez, who was evaluated through a patient-initiated, synchronous (real-time) audio only encounter, and/or her healthcare decision maker, is aware that it is a billable service, with coverage as determined by his insurance carrier. He provided verbal consent to proceed: Yes. He has not had a related appointment within my department in the past 7 days or scheduled within the next 24 hours.       Total Time: minutes: 11-20 minutes    Ivelisse Reyes MD

## 2021-01-06 NOTE — PATIENT INSTRUCTIONS
Home Blood Pressure Test: About This Test 
What is it? A home blood pressure test allows you to keep track of your blood pressure at home. Blood pressure is a measure of the force of blood against the walls of your arteries. Blood pressure readings include two numbers, such as 130/80 (say \"130 over 80\"). The first number is the systolic pressure. The second number is the diastolic pressure. Why is this test done? You may do this test at home to: · Find out if you have high blood pressure. · Track your blood pressure if you have high blood pressure. · Track how well medicine is working to reduce high blood pressure. · Check how lifestyle changes, such as weight loss and exercise, are affecting blood pressure. How do you prepare for the test? 
For at least 30 minutes before you take your blood pressure, don't exercise or use caffeine, tobacco, or medicines that raise blood pressure. Take your blood pressure while you feel comfortable and relaxed. Sit quietly with both feet on the floor for at least 5 minutes before the test. 
How is the test done? · Sit with your arm slightly bent and resting on a table so that your upper arm is at the same level as your heart. · Roll up your sleeve or take off your shirt to expose your upper arm. · Wrap the blood pressure cuff around your upper arm so that the lower edge of the cuff is about 1 inch above the bend of your elbow. Proceed with the following steps depending on if you are using an automatic or manual pressure monitor. Automatic blood pressure monitors · Press the on/off button on the automatic monitor and wait until the ready-to-measure \"heart\" symbol appears next to zero in the display window. · Press the start button. The cuff will inflate and deflate by itself. · Your blood pressure numbers will appear on the screen. · Write your numbers in your log book, along with the date and time. Manual blood pressure monitors · Place the earpieces of a stethoscope in your ears, and place the bell of the stethoscope over the artery, just below the cuff. · Close the valve on the rubber inflating bulb. · Squeeze the bulb rapidly with your opposite hand to inflate the cuff until the dial or column of mercury reads about 30 mm Hg higher than your usual systolic pressure. If you do not know your usual pressure, inflate the cuff to 210 mm Hg or until the pulse at your wrist disappears. · Open the pressure valve just slightly by twisting or pressing the valve on the bulb. · As you watch the pressure slowly fall, note the level on the dial at which you first start to hear a pulsing or tapping sound through the stethoscope. This is your systolic blood pressure. · Continue letting the air out slowly. The sounds will become muffled and will finally disappear. Note the pressure when the sounds completely disappear. This is your diastolic blood pressure. Let out all the remaining air. · Write your numbers in your log book, along with the date and time. Follow-up care is a key part of your treatment and safety. Be sure to make and go to all appointments, and call your doctor if you are having problems. It's also a good idea to keep a list of the medicines you take. Where can you learn more? Go to http://www.okeefe.com/ Enter C427 in the search box to learn more about \"Home Blood Pressure Test: About This Test.\" Current as of: December 16, 2019               Content Version: 12.6 © 4301-4594 [employer]. Care instructions adapted under license by [employer] (which disclaims liability or warranty for this information). If you have questions about a medical condition or this instruction, always ask your healthcare professional. [place] [address] any warranty or liability for your use of this information.

## 2021-02-24 RX ORDER — VALSARTAN 160 MG/1
TABLET ORAL
Qty: 90 TAB | Refills: 1 | Status: SHIPPED | OUTPATIENT
Start: 2021-02-24 | End: 2021-06-29 | Stop reason: SDUPTHER

## 2021-02-24 NOTE — TELEPHONE ENCOUNTER
Last Visit: 1/6/21 with MD Suri Reyes  Next Appointment: Advised to follow-up in 4 months  Previous Refill Encounter(s): 7/29/20 #30 with 6 refills    Requested Prescriptions     Pending Prescriptions Disp Refills    valsartan (DIOVAN) 160 mg tablet [Pharmacy Med Name: VALSARTAN 160 MG TABLET] 90 Tab 1     Sig: TAKE 1 TABLET BY MOUTH EVERY DAY

## 2021-05-26 ENCOUNTER — HOSPITAL ENCOUNTER (OUTPATIENT)
Dept: LAB | Age: 76
Discharge: HOME | End: 2021-05-26
Payer: MEDICARE

## 2021-05-26 ENCOUNTER — APPOINTMENT (OUTPATIENT)
Dept: FAMILY MEDICINE CLINIC | Age: 76
End: 2021-05-26

## 2021-05-26 DIAGNOSIS — E11.65 TYPE 2 DIABETES MELLITUS WITH HYPERGLYCEMIA, WITH LONG-TERM CURRENT USE OF INSULIN (HCC): ICD-10-CM

## 2021-05-26 DIAGNOSIS — Z79.4 TYPE 2 DIABETES MELLITUS WITH HYPERGLYCEMIA, WITH LONG-TERM CURRENT USE OF INSULIN (HCC): ICD-10-CM

## 2021-05-26 DIAGNOSIS — I10 HTN (HYPERTENSION), BENIGN: ICD-10-CM

## 2021-05-26 DIAGNOSIS — E78.00 HYPERCHOLESTEROLEMIA: ICD-10-CM

## 2021-05-26 LAB
ALBUMIN SERPL-MCNC: 4 G/DL (ref 3.4–5)
ALBUMIN/GLOB SERPL: 1.3 {RATIO} (ref 0.8–1.7)
ALP SERPL-CCNC: 62 U/L (ref 45–117)
ALT SERPL-CCNC: 32 U/L (ref 16–61)
ANION GAP SERPL CALC-SCNC: 8 MMOL/L (ref 3–18)
AST SERPL-CCNC: 17 U/L (ref 10–38)
BILIRUB SERPL-MCNC: 0.8 MG/DL (ref 0.2–1)
BUN SERPL-MCNC: 19 MG/DL (ref 7–18)
BUN/CREAT SERPL: 21 (ref 12–20)
CALCIUM SERPL-MCNC: 9.4 MG/DL (ref 8.5–10.1)
CHLORIDE SERPL-SCNC: 108 MMOL/L (ref 100–111)
CHOLEST SERPL-MCNC: 168 MG/DL
CO2 SERPL-SCNC: 26 MMOL/L (ref 21–32)
CREAT SERPL-MCNC: 0.92 MG/DL (ref 0.6–1.3)
EST. AVERAGE GLUCOSE BLD GHB EST-MCNC: 137 MG/DL
GLOBULIN SER CALC-MCNC: 3.1 G/DL (ref 2–4)
GLUCOSE SERPL-MCNC: 124 MG/DL (ref 74–99)
HBA1C MFR BLD: 6.4 % (ref 4.2–5.6)
HDLC SERPL-MCNC: 43 MG/DL (ref 40–60)
HDLC SERPL: 3.9 {RATIO} (ref 0–5)
LDLC SERPL CALC-MCNC: 112.4 MG/DL (ref 0–100)
LIPID PROFILE,FLP: ABNORMAL
POTASSIUM SERPL-SCNC: 3.9 MMOL/L (ref 3.5–5.5)
PROT SERPL-MCNC: 7.1 G/DL (ref 6.4–8.2)
SODIUM SERPL-SCNC: 142 MMOL/L (ref 136–145)
TRIGL SERPL-MCNC: 63 MG/DL (ref ?–150)
VLDLC SERPL CALC-MCNC: 12.6 MG/DL

## 2021-05-26 PROCEDURE — 80053 COMPREHEN METABOLIC PANEL: CPT

## 2021-05-26 PROCEDURE — 36415 COLL VENOUS BLD VENIPUNCTURE: CPT

## 2021-05-26 PROCEDURE — 83036 HEMOGLOBIN GLYCOSYLATED A1C: CPT

## 2021-05-26 PROCEDURE — 80061 LIPID PANEL: CPT

## 2021-06-29 ENCOUNTER — OFFICE VISIT (OUTPATIENT)
Dept: FAMILY MEDICINE CLINIC | Age: 76
End: 2021-06-29
Payer: MEDICARE

## 2021-06-29 VITALS
OXYGEN SATURATION: 93 % | TEMPERATURE: 97.1 F | WEIGHT: 166 LBS | RESPIRATION RATE: 18 BRPM | HEIGHT: 65 IN | DIASTOLIC BLOOD PRESSURE: 65 MMHG | BODY MASS INDEX: 27.66 KG/M2 | HEART RATE: 73 BPM | SYSTOLIC BLOOD PRESSURE: 118 MMHG

## 2021-06-29 DIAGNOSIS — E78.00 HYPERCHOLESTEROLEMIA: ICD-10-CM

## 2021-06-29 DIAGNOSIS — I73.9 PVD (PERIPHERAL VASCULAR DISEASE) (HCC): ICD-10-CM

## 2021-06-29 DIAGNOSIS — I10 HTN (HYPERTENSION), BENIGN: ICD-10-CM

## 2021-06-29 DIAGNOSIS — E11.21 TYPE 2 DIABETES WITH NEPHROPATHY (HCC): Primary | ICD-10-CM

## 2021-06-29 DIAGNOSIS — Z89.619 S/P AKA (ABOVE KNEE AMPUTATION) UNILATERAL (HCC): ICD-10-CM

## 2021-06-29 DIAGNOSIS — Z11.59 NEED FOR HEPATITIS C SCREENING TEST: ICD-10-CM

## 2021-06-29 PROCEDURE — G8427 DOCREV CUR MEDS BY ELIG CLIN: HCPCS | Performed by: FAMILY MEDICINE

## 2021-06-29 PROCEDURE — G0463 HOSPITAL OUTPT CLINIC VISIT: HCPCS | Performed by: FAMILY MEDICINE

## 2021-06-29 PROCEDURE — G8419 CALC BMI OUT NRM PARAM NOF/U: HCPCS | Performed by: FAMILY MEDICINE

## 2021-06-29 PROCEDURE — 1101F PT FALLS ASSESS-DOCD LE1/YR: CPT | Performed by: FAMILY MEDICINE

## 2021-06-29 PROCEDURE — G8536 NO DOC ELDER MAL SCRN: HCPCS | Performed by: FAMILY MEDICINE

## 2021-06-29 PROCEDURE — G8752 SYS BP LESS 140: HCPCS | Performed by: FAMILY MEDICINE

## 2021-06-29 PROCEDURE — 99214 OFFICE O/P EST MOD 30 MIN: CPT | Performed by: FAMILY MEDICINE

## 2021-06-29 PROCEDURE — G8754 DIAS BP LESS 90: HCPCS | Performed by: FAMILY MEDICINE

## 2021-06-29 PROCEDURE — G8510 SCR DEP NEG, NO PLAN REQD: HCPCS | Performed by: FAMILY MEDICINE

## 2021-06-29 RX ORDER — VALSARTAN 160 MG/1
TABLET ORAL
Qty: 90 TABLET | Refills: 3 | Status: SHIPPED | OUTPATIENT
Start: 2021-06-29 | End: 2022-05-12 | Stop reason: SDUPTHER

## 2021-06-29 NOTE — PATIENT INSTRUCTIONS
Diabetes Foot Health: Care Instructions  Your Care Instructions     When you have diabetes, your feet need extra care and attention. Diabetes can damage the nerve endings and blood vessels in your feet, making you less likely to notice when your feet are injured. Diabetes also limits your body's ability to fight infection and get blood to areas that need it. If you get a minor foot injury, it could become an ulcer or a serious infection. With good foot care, you can prevent most of these problems. Caring for your feet can be quick and easy. Most of the care can be done when you are bathing or getting ready for bed. Follow-up care is a key part of your treatment and safety. Be sure to make and go to all appointments, and call your doctor if you are having problems. It's also a good idea to know your test results and keep a list of the medicines you take. How can you care for yourself at home? · Keep your blood sugar close to normal by watching what and how much you eat, monitoring blood sugar, taking medicines if prescribed, and getting regular exercise. · Do not smoke. Smoking affects blood flow and can make foot problems worse. If you need help quitting, talk to your doctor about stop-smoking programs and medicines. These can increase your chances of quitting for good. · Eat a diet that is low in fats. High fat intake can cause fat to build up in your blood vessels and decrease blood flow. · Inspect your feet daily for blisters, cuts, cracks, or sores. If you cannot see well, use a mirror or have someone help you. · Take care of your feet:  ? Wash your feet every day. Use warm (not hot) water. Check the water temperature with your wrists or other part of your body, not your feet. ? Dry your feet well. Pat them dry. Do not rub the skin on your feet too hard. Dry well between your toes. If the skin on your feet stays moist, bacteria or a fungus can grow, which can lead to infection. ?  Keep your skin soft. Use moisturizing skin cream to keep the skin on your feet soft and prevent calluses and cracks. But do not put the cream between your toes, and stop using any cream that causes a rash. ? Clean underneath your toenails carefully. Do not use a sharp object to clean underneath your toenails. Use the blunt end of a nail file or other rounded tool. ? Trim and file your toenails straight across to prevent ingrown toenails. Use a nail clipper, not scissors. Use an emery board to smooth the edges. · Change socks daily. Socks without seams are best, because seams often rub the feet. You can find socks for people with diabetes from specialty catalogs. · Look inside your shoes every day for things like gravel or torn linings, which could cause blisters or sores. · Buy shoes that fit well:  ? Look for shoes that have plenty of space around the toes. This helps prevent bunions and blisters. ? Try on shoes while wearing the kind of socks you will usually wear with the shoes. ? Avoid plastic shoes. They may rub your feet and cause blisters. Good shoes should be made of materials that are flexible and breathable, such as leather or cloth. ? Break in new shoes slowly by wearing them for no more than an hour a day for several days. Take extra time to check your feet for red areas, blisters, or other problems after you wear new shoes. · Do not go barefoot. Do not wear sandals, and do not wear shoes with very thin soles. Thin soles are easy to puncture. They also do not protect your feet from hot pavement or cold weather. · Have your doctor check your feet during each visit. If you have a foot problem, see your doctor. Do not try to treat an early foot problem at home. Home remedies or treatments that you can buy without a prescription (such as corn removers) can be harmful. · Always get early treatment for foot problems. A minor irritation can lead to a major problem if not properly cared for early.   When should you call for help? Call your doctor now or seek immediate medical care if:    · You have a foot sore, an ulcer or break in the skin that is not healing after 4 days, bleeding corns or calluses, or an ingrown toenail.     · You have blue or black areas, which can mean bruising or blood flow problems.     · You have peeling skin or tiny blisters between your toes or cracking or oozing of the skin.     · You have a fever for more than 24 hours and a foot sore.     · You have new numbness or tingling in your feet that does not go away after you move your feet or change positions.     · You have unexplained or unusual swelling of the foot or ankle. Watch closely for changes in your health, and be sure to contact your doctor if:    · You cannot do proper foot care. Where can you learn more? Go to http://www.gray.com/  Enter A739 in the search box to learn more about \"Diabetes Foot Health: Care Instructions. \"  Current as of: August 31, 2020               Content Version: 12.8  © 2006-2021 [email or web address]. Care instructions adapted under license by [employer] (which disclaims liability or warranty for this information). If you have questions about a medical condition or this instruction, always ask your healthcare professional. [address] any warranty or liability for your use of this information.

## 2021-06-29 NOTE — PROGRESS NOTES
HPI:  Jessi Flores is a 67 y.o. male who presents today with   Chief Complaint   Patient presents with    Diabetes    Hypertension    Cholesterol Problem     f/u        Pt doing well  NO complaints    Has a h/o PVD; pt has a left aka. He uses a powered mobility scooter. Able to bear weight. Hypertension: His blood pressure is stable today. He is on medications as listed below. He tolerates medications well. Diabetes: Is also stable patient manages this with diet and exercise. Patient needs a refill on Diovan. Lab Results   Component Value Date/Time    Hemoglobin A1c 6.4 (H) 05/26/2021 10:20 AM             3 most recent PHQ Screens 6/29/2021   Little interest or pleasure in doing things Not at all   Feeling down, depressed, irritable, or hopeless Not at all   Total Score PHQ 2 0               PMH,  Meds, Allergies, Family History, Social history reviewed      Current Outpatient Medications   Medication Sig Dispense Refill    valsartan (DIOVAN) 160 mg tablet TAKE 1 TABLET BY MOUTH EVERY DAY 90 Tablet 3    pravastatin (PRAVACHOL) 40 mg tablet TAKE 1 TABLET BY MOUTH DAILY 90 Tab 3    hydroCHLOROthiazide (MICROZIDE) 12.5 mg capsule TAKE 1 CAPSULE BY MOUTH DAILY 90 Cap 3    MULTIVITAMINS (MULTI-VITAMIN PO) Take  by mouth. No Known Allergies               ROS as per HPI.        Visit Vitals  /65   Pulse 73   Temp 97.1 °F (36.2 °C) (Temporal)   Resp 18   Ht 5' 5\" (1.651 m)   Wt 166 lb (75.3 kg)   SpO2 93%   BMI 27.62 kg/m²     Physical Exam   General appearance: alert, cooperative, no distress, appears stated age  Neck: supple, symmetrical, trachea midline, no adenopathy, thyroid: not enlarged, symmetric, no tenderness/mass/nodules, no carotid bruit and no JVD  Lungs: clear to auscultation bilaterally  Heart: regular rate and rhythm, S1, S2 normal, no murmur, click, rub or gallop  Extremities: extremities normal, atraumatic, no cyanosis or edema        Lab Results   Component Value Date/Time    Cholesterol, total 168 05/26/2021 10:20 AM    HDL Cholesterol 43 05/26/2021 10:20 AM    LDL, calculated 112.4 (H) 05/26/2021 10:20 AM    VLDL, calculated 12.6 05/26/2021 10:20 AM    Triglyceride 63 05/26/2021 10:20 AM    CHOL/HDL Ratio 3.9 05/26/2021 10:20 AM       Lab Results   Component Value Date/Time    Sodium 142 05/26/2021 10:20 AM    Potassium 3.9 05/26/2021 10:20 AM    Chloride 108 05/26/2021 10:20 AM    CO2 26 05/26/2021 10:20 AM    Anion gap 8 05/26/2021 10:20 AM    Glucose 124 (H) 05/26/2021 10:20 AM    BUN 19 (H) 05/26/2021 10:20 AM    Creatinine 0.92 05/26/2021 10:20 AM    BUN/Creatinine ratio 21 (H) 05/26/2021 10:20 AM    GFR est AA >60 05/26/2021 10:20 AM    GFR est non-AA >60 05/26/2021 10:20 AM    Calcium 9.4 05/26/2021 10:20 AM       Assessment/Plan:    Diagnoses and all orders for this visit:    1. Type 2 diabetes with nephropathy (HCC)  -     LIPID PANEL; Future  -     METABOLIC PANEL, COMPREHENSIVE; Future  -     HEMOGLOBIN A1C WITH EAG; Future    2. S/P AKA (above knee amputation) unilateral ([facility]    3. PVD (peripheral vascular disease) ([facility]    4. HTN (hypertension), benign    5. Hypercholesterolemia    6. Need for hepatitis C screening test  -     HEPATITIS C AB; Future    Other orders  -     valsartan (DIOVAN) 160 mg tablet; TAKE 1 TABLET BY MOUTH EVERY DAY      As above,   above all stable unless otherwise noted   treatment plan as listed below  Orders Placed This Encounter    LIPID PANEL    METABOLIC PANEL, COMPREHENSIVE    HEMOGLOBIN A1C WITH EAG    HEPATITIS C AB    valsartan (DIOVAN) 160 mg tablet              Follow-up and Dispositions    · Return in about 3 months (around 9/29/2021) for medicare well.             Hermann Gonzales MD

## 2021-06-29 NOTE — PROGRESS NOTES
Khris Santizo presents today for   Chief Complaint   Patient presents with    Diabetes    Hypertension    Cholesterol Problem     f/u       Is someone accompanying this pt? no    Is the patient using any DME equipment during OV? no    Depression Screening:  3 most recent PHQ Screens 6/29/2021   Little interest or pleasure in doing things Not at all   Feeling down, depressed, irritable, or hopeless Not at all   Total Score PHQ 2 0       Learning Assessment:  Learning Assessment 1/17/2013   PRIMARY LEARNER Patient   PRIMARY LANGUAGE ENGLISH   LEARNER PREFERENCE PRIMARY LISTENING   ANSWERED BY patient   RELATIONSHIP SELF       Abuse Screening:  Abuse Screening Questionnaire 6/29/2021   Do you ever feel afraid of your partner? N   Are you in a relationship with someone who physically or mentally threatens you? N   Is it safe for you to go home? Y       Fall Risk  Fall Risk Assessment, last 12 mths 6/29/2021   Able to walk? Yes   Fall in past 12 months? 0   Do you feel unsteady? 0   Are you worried about falling 0       ADL  ADL Assessment 10/7/2014   Feeding yourself No Help Needed   Getting from bed to chair No Help Needed   Getting dressed No Help Needed   Bathing or showering No Help Needed   Walk across the room (includes cane/walker) No Help Needed   Using the telphone No Help Needed   Taking your medications No Help Needed   Preparing meals No Help Needed   Managing money (expenses/bills) No Help Needed   Moderately strenuous housework (laundry) No Help Needed   Shopping for personal items (toiletries/medicines) No Help Needed   Shopping for groceries No Help Needed   Driving No Help Needed   Climbing a flight of stairs No Help Needed   Getting to places beyond walking distances No Help Needed       Health Maintenance reviewed and discussed and ordered per Provider.     Health Maintenance Due   Topic Date Due    Hepatitis C Screening  Never done    Shingrix Vaccine Age 50> (1 of 2) Never done   [place] Pneumococcal 65+ years (1 of 1 - PPSV23) Never done    Foot Exam Q1  07/12/2018    Eye Exam Retinal or Dilated  05/17/2019    MICROALBUMIN Q1  01/28/2021    COVID-19 Vaccine (2 - Moderna 2-dose series) 03/25/2021   . Coordination of Care:  1. Have you been to the ER, urgent care clinic since your last visit? Hospitalized since your last visit? no    2. Have you seen or consulted any other health care providers outside of the [address] [place] [address] since your last visit? Include any pap smears or colon screening.  no

## 2021-09-08 ENCOUNTER — OFFICE VISIT (OUTPATIENT)
Dept: FAMILY MEDICINE CLINIC | Age: 76
End: 2021-09-08
Payer: MEDICARE

## 2021-09-08 VITALS
OXYGEN SATURATION: 92 % | WEIGHT: 167 LBS | HEIGHT: 65 IN | TEMPERATURE: 97.1 F | HEART RATE: 95 BPM | SYSTOLIC BLOOD PRESSURE: 126 MMHG | RESPIRATION RATE: 16 BRPM | DIASTOLIC BLOOD PRESSURE: 67 MMHG | BODY MASS INDEX: 27.82 KG/M2

## 2021-09-08 DIAGNOSIS — E11.21 TYPE 2 DIABETES WITH NEPHROPATHY (HCC): ICD-10-CM

## 2021-09-08 DIAGNOSIS — Z00.00 MEDICARE ANNUAL WELLNESS VISIT, SUBSEQUENT: Primary | ICD-10-CM

## 2021-09-08 DIAGNOSIS — I10 HTN (HYPERTENSION), BENIGN: ICD-10-CM

## 2021-09-08 DIAGNOSIS — E78.00 HYPERCHOLESTEROLEMIA: ICD-10-CM

## 2021-09-08 PROCEDURE — G8536 NO DOC ELDER MAL SCRN: HCPCS | Performed by: FAMILY MEDICINE

## 2021-09-08 PROCEDURE — G0463 HOSPITAL OUTPT CLINIC VISIT: HCPCS | Performed by: FAMILY MEDICINE

## 2021-09-08 PROCEDURE — G8419 CALC BMI OUT NRM PARAM NOF/U: HCPCS | Performed by: FAMILY MEDICINE

## 2021-09-08 PROCEDURE — G8427 DOCREV CUR MEDS BY ELIG CLIN: HCPCS | Performed by: FAMILY MEDICINE

## 2021-09-08 PROCEDURE — 1101F PT FALLS ASSESS-DOCD LE1/YR: CPT | Performed by: FAMILY MEDICINE

## 2021-09-08 PROCEDURE — G8510 SCR DEP NEG, NO PLAN REQD: HCPCS | Performed by: FAMILY MEDICINE

## 2021-09-08 PROCEDURE — G8754 DIAS BP LESS 90: HCPCS | Performed by: FAMILY MEDICINE

## 2021-09-08 PROCEDURE — G0439 PPPS, SUBSEQ VISIT: HCPCS | Performed by: FAMILY MEDICINE

## 2021-09-08 PROCEDURE — G8752 SYS BP LESS 140: HCPCS | Performed by: FAMILY MEDICINE

## 2021-09-08 PROCEDURE — 99214 OFFICE O/P EST MOD 30 MIN: CPT | Performed by: FAMILY MEDICINE

## 2021-09-08 NOTE — PROGRESS NOTES
Chief Complaint   Patient presents with    Annual Wellness Visit       Pt preferred language for health care discussion is english. Is someone accompanying this pt? no    Is the patient using any DME equipment during [address]? Yes     Depression Screening:  3 most recent [facility] Screens 9/8/2021 6/29/2021 1/28/2020 10/2/2019 9/5/2019 4/18/2019 4/3/2018   Little interest or pleasure in doing things Not at all Not at all Not at all Not at all Not at all Not at all Not at all   Feeling down, depressed, irritable, or hopeless Not at all Not at all Not at all Not at all Not at all Not at all Not at all   Total Score PHQ 2 0 0 0 0 0 0 0       Learning Assessment:  Learning Assessment 1/17/2013   PRIMARY LEARNER Patient   PRIMARY LANGUAGE ENGLISH   LEARNER PREFERENCE PRIMARY LISTENING   ANSWERED BY patient   RELATIONSHIP SELF       Abuse Screening:  Abuse Screening Questionnaire 9/8/2021 6/29/2021 4/3/2018 10/7/2014   Do you ever feel afraid of your partner? N N N N   Are you in a relationship with someone who physically or mentally threatens you? N N N N   Is it safe for you to go home? Jean HOOPER       Fall Risk  Fall Risk Assessment, last 12 mths 6/29/2021   Able to walk? Yes   Fall in past 12 months? 0   Do you feel unsteady? 0   Are you worried about falling 0           Advance Directive:  1. Do you have an advance directive in place? Patient Reply:no    2. If not, would you like material regarding how to put one in place? Patient Reply: no    Coordination of Care:  1. Have you been to the ER, urgent care clinic since your last visit? Hospitalized since your last visit? no    2. Have you seen or consulted any other health care providers outside of the [address] [place] [address] since your last visit? Include any pap smears or colon screening.  no

## 2021-09-08 NOTE — PROGRESS NOTES
This is the Subsequent Medicare Annual Wellness Exam, performed 12 months or more after the Initial AWV or the last Subsequent AWV    I have reviewed the patient's medical history in detail and updated the computerized patient record. Pt has been well   He has no new complaints  Has diet controlled diabetes. Assessment/Plan   Education and counseling provided:  Are appropriate based on today's review and evaluation  End-of-Life planning (with patient's consent)    . Diagnoses and all orders for this visit:    1. Type 2 diabetes with nephropathy (HCC)  -     HEMOGLOBIN A1C WITH EAG; Future    2. HTN (hypertension), benign  -     METABOLIC PANEL, COMPREHENSIVE; Future    3. Hypercholesterolemia  -     LIPID PANEL; Future      Follow-up and Dispositions    · Return in about 4 months (around 1/8/2022) for diabetes, htn. This has been fully explained to the patient, who indicates understanding. An After Visit Summary was printed and given to the patient. Depression Risk Factor Screening     3 most recent PHQ Screens 6/29/2021   Little interest or pleasure in doing things Not at all   Feeling down, depressed, irritable, or hopeless Not at all   Total Score PHQ 2 0       Alcohol Risk Screen    Do you average more than 1 drink per night or more than 7 drinks a week: No    In the past three months have you have had more than 4 drinks containing alcohol on one occasion: No        Functional Ability and Level of Safety    Hearing: Hearing is good. Activities of Daily Living: The home contains: motorized wheelchair, wheelchair and walker. Patient does total self care      Ambulation: wheelchair bound     Fall Risk:  Fall Risk Assessment, last 12 mths 6/29/2021   Able to walk? Yes   Fall in past 12 months? 0   Do you feel unsteady?  0   Are you worried about falling 0      Abuse Screen:  Patient is not abused       Cognitive Screening    Has your family/caregiver stated any concerns about your memory: no     Cognitive Screening: cognition intact    Health Maintenance Due     Health Maintenance Due   Topic Date Due    Hepatitis C Screening  Never done    Shingrix Vaccine Age 50> (1 of 2) Never done    Low dose CT lung screening  Never done    Pneumococcal 65+ years (1 of 1 - PPSV23) Never done    Foot Exam Q1  07/12/2018    Eye Exam Retinal or Dilated  05/17/2019    MICROALBUMIN Q1  01/28/2021    Flu Vaccine (1) Never done    Medicare Yearly Exam  09/04/2021       Patient Care Team   Patient Care Team:  Trip Duarte MD as PCP - General  Trip Duarte MD as PCP [facility]  Issa Padilla MD (Podiatry)  Kelsie Duarte MD (Ophthalmology)     PE:    Visit Vitals  /67 (BP 1 Location: Left upper arm, BP Patient Position: Sitting, BP Cuff Size: Adult)   Pulse 95   Temp 97.1 °F (36.2 °C) (Temporal)   Resp 16   Ht 5' 5\" (1.651 m)   Wt 167 lb (75.8 kg)   SpO2 92%   BMI 27.79 kg/m²     General appearance: alert, cooperative, no distress, appears stated age  Neck: supple, symmetrical, trachea midline, no adenopathy, thyroid: not enlarged, symmetric, no tenderness/mass/nodules, no carotid bruit and no JVD  Lungs: clear to auscultation bilaterally  Heart: regular rate and rhythm, S1, S2 normal, no murmur, click, rub or gallop  Extremities: extremities normal, atraumatic, no cyanosis or edema        Lab Results   Component Value Date/Time    Cholesterol, total 168 05/26/2021 10:20 AM    HDL Cholesterol 43 05/26/2021 10:20 AM    LDL, calculated 112.4 (H) 05/26/2021 10:20 AM    VLDL, calculated 12.6 05/26/2021 10:20 AM    Triglyceride 63 05/26/2021 10:20 AM    CHOL/HDL Ratio 3.9 05/26/2021 10:20 AM       History     Patient Active Problem List   Diagnosis Code    Gout M10.9    PVD (peripheral vascular disease) (HCC) I73.9    Hypercholesterolemia E78.00    Arthritis M19.90    Cataract H26.9    DM (diabetes mellitus) (HCC) E11.9    HTN (hypertension), benign I10    Advanced directives, counseling/discussion Z71.89    Type 2 diabetes with nephropathy (HCC) E11.21     Past Medical History:   Diagnosis Date    Arthritis     hands    Cataract     left eye    Diabetes ([facility]     diet controlled    DM (diabetes mellitus) ([facility] 2/3/2010    HTN (hypertension), benign     Hypercholesterolemia     PVD (peripheral vascular disease) ([facility] 2/3/2010      Past Surgical History:   Procedure Laterality Date    HX AMPUTATION      left leg aka prosthesis due to PVD    [place] CARDIAC SURG PROCEDURE UNLIST  1995    left fem/pop bypass    [place] CARDIAC SURG PROCEDURE UNLIST  1997    occlusion bypass angiographic thrombolysis failed    [place] CARDIAC SURG PROCEDURE UNLIST      s/p left illiac/pop     Current Outpatient Medications   Medication Sig Dispense Refill    valsartan (DIOVAN) 160 mg tablet TAKE 1 TABLET BY MOUTH EVERY DAY 90 Tablet 3    pravastatin (PRAVACHOL) 40 mg tablet TAKE 1 TABLET BY MOUTH DAILY 90 Tab 3    hydroCHLOROthiazide (MICROZIDE) 12.5 mg capsule TAKE 1 CAPSULE BY MOUTH DAILY 90 Cap 3    MULTIVITAMINS (MULTI-VITAMIN PO) Take  by mouth. No Known Allergies    Family History   Problem Relation Age of Onset    Hypertension Father      Social History     Tobacco Use    Smoking status: Current Every Day Smoker     Packs/day: 0.50     Years: 59.00     Pack years: 29.50     Types: Cigarettes     Start date: 1961    Smokeless tobacco: Never Used   Substance Use Topics    Alcohol use:  No

## 2021-09-08 NOTE — PATIENT INSTRUCTIONS
Medicare Wellness Visit, Male    The best way to live healthy is to have a lifestyle where you eat a well-balanced diet, exercise regularly, limit alcohol use, and quit all forms of tobacco/nicotine, if applicable. Regular preventive services are another way to keep healthy. Preventive services (vaccines, screening tests, monitoring & exams) can help personalize your care plan, which helps you manage your own care. Screening tests can find health problems at the earliest stages, when they are easiest to treat. [facility] follows the current, evidence-based guidelines published by the [facility] ([facility]) when recommending preventive services for our patients. Because we follow these guidelines, sometimes recommendations change over time as research supports it. (For example, a prostate screening blood test is no longer routinely recommended for men with no symptoms). Of course, you and your doctor may decide to screen more often for some diseases, based on your risk and co-morbidities (chronic disease you are already diagnosed with). Preventive services for you include:  - Medicare offers their members a free annual wellness visit, which is time for you and your primary care provider to discuss and plan for your preventive service needs. Take advantage of this benefit every year!  -All adults over age 72 should receive the recommended pneumonia vaccines. Current USPSTF guidelines recommend a series of two vaccines for the best pneumonia protection.   -All adults should have a flu vaccine yearly and tetanus vaccine every 10 years.  -All adults age 48 and older should receive the shingles vaccines (series of two vaccines).        -All adults age 38-68 who are overweight should have a diabetes screening test once every three years.   -Other screening tests & preventive services for persons with diabetes include: an eye exam to screen for diabetic retinopathy, a kidney function test, a foot exam, and stricter control over your cholesterol.   -Cardiovascular screening for adults with routine risk involves an electrocardiogram (ECG) at intervals determined by the provider.   -Colorectal cancer screening should be done for adults age 54-65 with no increased risk factors for colorectal cancer. There are a number of acceptable methods of screening for this type of cancer. Each test has its own benefits and drawbacks. Discuss with your provider what is most appropriate for you during your annual wellness visit. The different tests include: colonoscopy (considered the best screening method), a fecal occult blood test, a fecal DNA test, and sigmoidoscopy.  -All adults born between [facility] should be screened once for Hepatitis C.  -An Abdominal Aortic Aneurysm (AAA) Screening is recommended for men age 73-68 who has ever smoked in their lifetime.      Here is a list of your current Health Maintenance items (your personalized list of preventive services) with a due date:  Health Maintenance Due   Topic Date Due    Hepatitis C Test  Never done    Shingles Vaccine (1 of 2) Never done    Pneumococcal Vaccine (1 of 1 - PPSV23) Never done    Diabetic Foot Care  07/12/2018    Eye Exam  05/17/2019    Albumin Urine Test  01/28/2021    Yearly Flu Vaccine (1) Never done

## 2021-10-15 ENCOUNTER — TELEPHONE (OUTPATIENT)
Dept: FAMILY MEDICINE CLINIC | Age: 76
End: 2021-10-15

## 2021-10-15 NOTE — TELEPHONE ENCOUNTER
Ayad with [address] called to request pre op appt for emergency surgery for glaucoma, no available openings, requested message sent to ask how to obtain.  Please adv [phone] ext [phone]

## 2021-11-13 RX ORDER — PRAVASTATIN SODIUM 40 MG/1
TABLET ORAL
Qty: 90 TABLET | Refills: 3 | Status: SHIPPED | OUTPATIENT
Start: 2021-11-13

## 2022-01-12 ENCOUNTER — OFFICE VISIT (OUTPATIENT)
Dept: FAMILY MEDICINE CLINIC | Age: 77
End: 2022-01-12
Payer: MEDICARE

## 2022-01-12 VITALS
DIASTOLIC BLOOD PRESSURE: 84 MMHG | WEIGHT: 167 LBS | TEMPERATURE: 96.8 F | BODY MASS INDEX: 27.82 KG/M2 | SYSTOLIC BLOOD PRESSURE: 130 MMHG | RESPIRATION RATE: 16 BRPM | OXYGEN SATURATION: 96 % | HEIGHT: 65 IN | HEART RATE: 69 BPM

## 2022-01-12 DIAGNOSIS — I73.9 PVD (PERIPHERAL VASCULAR DISEASE) (HCC): ICD-10-CM

## 2022-01-12 DIAGNOSIS — E11.21 TYPE 2 DIABETES WITH NEPHROPATHY (HCC): Primary | ICD-10-CM

## 2022-01-12 DIAGNOSIS — Z89.619 S/P AKA (ABOVE KNEE AMPUTATION) UNILATERAL (HCC): ICD-10-CM

## 2022-01-12 PROCEDURE — G8536 NO DOC ELDER MAL SCRN: HCPCS | Performed by: FAMILY MEDICINE

## 2022-01-12 PROCEDURE — G8419 CALC BMI OUT NRM PARAM NOF/U: HCPCS | Performed by: FAMILY MEDICINE

## 2022-01-12 PROCEDURE — G8752 SYS BP LESS 140: HCPCS | Performed by: FAMILY MEDICINE

## 2022-01-12 PROCEDURE — G8510 SCR DEP NEG, NO PLAN REQD: HCPCS | Performed by: FAMILY MEDICINE

## 2022-01-12 PROCEDURE — 1101F PT FALLS ASSESS-DOCD LE1/YR: CPT | Performed by: FAMILY MEDICINE

## 2022-01-12 PROCEDURE — G8427 DOCREV CUR MEDS BY ELIG CLIN: HCPCS | Performed by: FAMILY MEDICINE

## 2022-01-12 PROCEDURE — 99214 OFFICE O/P EST MOD 30 MIN: CPT | Performed by: FAMILY MEDICINE

## 2022-01-12 PROCEDURE — G8754 DIAS BP LESS 90: HCPCS | Performed by: FAMILY MEDICINE

## 2022-01-12 PROCEDURE — G0463 HOSPITAL OUTPT CLINIC VISIT: HCPCS | Performed by: FAMILY MEDICINE

## 2022-01-12 RX ORDER — PREDNISOLONE ACETATE 10 MG/ML
SUSPENSION/ DROPS OPHTHALMIC
COMMUNITY
Start: 2021-10-27

## 2022-01-12 RX ORDER — HYDROCHLOROTHIAZIDE 12.5 MG/1
CAPSULE ORAL
Qty: 90 CAPSULE | Refills: 3 | Status: SHIPPED | OUTPATIENT
Start: 2022-01-12

## 2022-01-12 RX ORDER — ATROPINE SULFATE 10 MG/ML
SOLUTION/ DROPS OPHTHALMIC
COMMUNITY
Start: 2021-10-27

## 2022-01-12 NOTE — PATIENT INSTRUCTIONS
Reduced Vision: Care Instructions  Your Care Instructions     Reduced vision can be caused by many things. These include macular degeneration and glaucoma. When you can't see as well, daily life can be more challenging. But you can do some things to stay independent and keep doing the activities you enjoy. Follow-up care is a key part of your treatment and safety. Be sure to make and go to all appointments, and call your doctor if you are having problems. It's also a good idea to know your test results and keep a list of the medicines you take. How can you care for yourself at home? Use lighting  · Point lighting at what you want to see. Don't point it at your eyes. · Add lamps where you need extra lighting. · Use curtains or shades to adjust how much natural light there is. · Use good lighting in places where you could easily fall. These include entries and stairways. Use labels  · Label things that are hard to recognize or that could be confusing. This might include medicines, spices, and foods. Use black letters on a white background. Or you can color-code the items. · Charls Bibber the positions of the temperature settings you use the most on your stove and oven. Also catalino the \"on\" and \"off\" positions. · Catalino the water temperatures you use on faucets in the kitchen and bathroom. To prevent overfilling a sink or bathtub, use waterproof markers or tape to catalino the water level you want. Avoid falls in your home  · Replace or remove any worn carpeting. Tape down or remove area rugs. · Do not wax your floors. Use nonskid, nonglare  on smooth floors. · Remove electrical cords from areas where you need to walk. Or tape them down so you won't trip on them. · Make sure furniture doesn't stick out into areas where you walk. Keep chairs pushed in under tables and desks. Keep all drawers closed. · Keep doors fully opened or fully closed. Don't leave them longterm open or shut.   · Use handrails on stairways and ramps. Make sure that they go beyond the top and bottom steps. Then you won't stumble if you miss a step. Use helpful technology  · Use a magnifying lens. You can buy ones that you hold. Or you can buy ones that attach to glasses. Some have lights built in.  · If your budget allows, you may want to think about a video magnifier system. These systems can make print, pictures, or other items bigger on a screen. · If you have a computer:  ? Try to adjust the display. You can often change how big the text and pictures appear. Then they will be easier to see and read. ? You may want to try special software. Some software can recognize spoken commands or change dictated speech into text. Other software allows computers to speak text and read documents. · Use large-print items. These include books, newspapers, magazines, and medicine labels. You can also listen to recordings of books. · Think about using devices made for people with low vision. Examples are clocks and watches that announce the time. There are also clocks, telephones, and calculators with extra-large buttons. Be safe while you stay active  · Ask your doctor what physical activities are safe for you. If you bend, lift things, or move fast, it may affect your health or vision. · Ask a friend to read you the instructions for a new exercise and to check your technique. · Walk with someone who can help look for things that may be a danger. · If you swim laps, use a pool that has ropes between the lanes. When should you call for help? Watch closely for changes in your health, and be sure to contact your doctor if:    · You have vision changes. Where can you learn more? Go to http://www.gray.com/  Enter Q620 in the search box to learn more about \"Reduced Vision: Care Instructions. \"  Current as of: April 29, 2021               Content Version: 13.0  © 9413-5767 [employer].    Care instructions adapted under license by [address] (which disclaims liability or warranty for this information). If you have questions about a medical condition or this instruction, always ask your healthcare professional. [address] any warranty or liability for your use of this information.

## 2022-01-12 NOTE — PROGRESS NOTES
1. \"Have you been to the ER, urgent care clinic since your last visit? Hospitalized since your last visit? \" No    2. \"Have you seen or consulted any other health care providers outside of the [address] [place] [address] since your last visit? \" No     3. For patients aged 39-70: Has the patient had a colonoscopy / FIT/ Cologuard?  Yes, HM satisfied with blue hyperlink

## 2022-01-12 NOTE — PROGRESS NOTES
HPI:  Karla Palmer is a 67 y.o. male who presents today with   Chief Complaint   Patient presents with    Diabetes     4 m f/u     Hypertension    Burn     right index finger       ;  Patient is here to follow-up on diabetes and high blood pressure. His diabetes is diet controlled. His last A1c was 6.4%. His blood pressure is stable today. He is on medications as listed below. He is compliant with his medications. Patient sustained a burn to his right index finger in September. It is healing. Exam of the right index finger is unremarkable. Revealing a healed superficial burn scar. He has no other new physical complaints today. Lab Results   Component Value Date/Time    Hemoglobin A1c 6.4 (H) 05/26/2021 10:20 AM     HTN: on  HCTZ; on diovan ;     Patient has had recent eye surgery for glaucoma. This is stable. Has a left AKA; he has a prosthesis; has had some recent trouble and needs a new order for a new prosthesis   order needs to go to [address] [place] [address]; [facility]. phone [phone] Fax [phone]         3 most recent [address] [place] [address],Third Floor 1/12/2022   PHQ Not Done Patient refuses   Little interest or pleasure in doing things Not at all   Feeling down, depressed, irritable, or hopeless Not at all   Total Score PHQ 2 0               PMH,  Meds, Allergies, Family History, Social history reviewed      Current Outpatient Medications   Medication Sig Dispense Refill    hydroCHLOROthiazide (MICROZIDE) 12.5 mg capsule TAKE 1 CAPSULE BY MOUTH DAILY 90 Capsule 3    pravastatin (PRAVACHOL) 40 mg tablet TAKE 1 TABLET BY MOUTH EVERY DAY 90 Tablet 3    valsartan (DIOVAN) 160 mg tablet TAKE 1 TABLET BY MOUTH EVERY DAY 90 Tablet 3    MULTIVITAMINS (MULTI-VITAMIN PO) Take  by mouth.       prednisoLONE acetate (PRED FORTE) 1 % ophthalmic suspension       atropine 1 % ophthalmic solution           No Known Allergies               ROS as per HPI      Visit Vitals  BP 130/84 (BP 1 Location: Right arm, BP Patient Position: Sitting, BP Cuff Size: Large adult)   Pulse 69   Temp 96.8 °F (36 °C) (Temporal)   Resp 16   Ht 5' 5\" (1.651 m)   Wt 167 lb (75.8 kg)   SpO2 96%   BMI 27.79 kg/m²     Physical Exam   General appearance: alert, cooperative, no distress, appears stated age  Neck: supple, symmetrical, trachea midline, no adenopathy, thyroid: not enlarged, symmetric, no tenderness/mass/nodules, no carotid bruit and no JVD  Lungs: clear to auscultation bilaterally  Heart: regular rate and rhythm, S1, S2 normal, no murmur, click, rub or gallop    Extremities: extremities normal, atraumatic, no cyanosis or edema      Assessment/Plan:    Diagnoses and all orders for this visit:    1. Type 2 diabetes with nephropathy (HCC)  -     LIPID PANEL; Future  -     METABOLIC PANEL, COMPREHENSIVE; Future  -     HEMOGLOBIN A1C WITH EAG; Future    2. PVD (peripheral vascular disease) ([facility])  -     AMB SUPPLY ORDER    3. S/P AKA (above knee amputation) unilateral ([facility])  -     AMB SUPPLY ORDER    Other orders  -     hydroCHLOROthiazide (MICROZIDE) 12.5 mg capsule; TAKE 1 CAPSULE BY MOUTH DAILY      As above  Patient stable  DME for replacement prosthetic ordered  Labs as ordered  AVS is accessible thru [facility] and pt has been advised of same. This has been fully explained to the patient, who indicates understanding. Follow-up and Dispositions    · Return in about 4 months (around 5/12/2022) for htn, Chol.             Teresa Herr MD

## 2022-03-19 PROBLEM — E11.21 TYPE 2 DIABETES WITH NEPHROPATHY (HCC): Status: ACTIVE | Noted: 2019-04-18

## 2022-04-07 ENCOUNTER — TELEPHONE (OUTPATIENT)
Dept: FAMILY MEDICINE CLINIC | Age: 77
End: 2022-04-07

## 2022-04-07 NOTE — TELEPHONE ENCOUNTER
Patient is in need of new prosthetic sent to [facility] due to current prosthetic being split/broken. Notes will be faxed over along with order.

## 2022-05-12 ENCOUNTER — HOSPITAL ENCOUNTER (OUTPATIENT)
Dept: LAB | Age: 77
Discharge: HOME | End: 2022-05-12
Payer: MEDICARE

## 2022-05-12 ENCOUNTER — OFFICE VISIT (OUTPATIENT)
Dept: FAMILY MEDICINE CLINIC | Age: 77
End: 2022-05-12
Payer: MEDICARE

## 2022-05-12 VITALS
OXYGEN SATURATION: 97 % | HEART RATE: 81 BPM | HEIGHT: 65 IN | DIASTOLIC BLOOD PRESSURE: 62 MMHG | BODY MASS INDEX: 27.16 KG/M2 | SYSTOLIC BLOOD PRESSURE: 152 MMHG | WEIGHT: 163 LBS | RESPIRATION RATE: 18 BRPM | TEMPERATURE: 97.5 F

## 2022-05-12 DIAGNOSIS — I10 HTN (HYPERTENSION), BENIGN: ICD-10-CM

## 2022-05-12 DIAGNOSIS — I10 HTN (HYPERTENSION), BENIGN: Primary | ICD-10-CM

## 2022-05-12 DIAGNOSIS — E11.21 TYPE 2 DIABETES WITH NEPHROPATHY (HCC): ICD-10-CM

## 2022-05-12 DIAGNOSIS — Z11.59 NEED FOR HEPATITIS C SCREENING TEST: ICD-10-CM

## 2022-05-12 LAB
ALBUMIN SERPL-MCNC: 4.3 G/DL (ref 3.4–5)
ALBUMIN/GLOB SERPL: 1.3 {RATIO} (ref 0.8–1.7)
ALP SERPL-CCNC: 60 U/L (ref 45–117)
ALT SERPL-CCNC: 35 U/L (ref 16–61)
ANION GAP SERPL CALC-SCNC: 4 MMOL/L (ref 3–18)
AST SERPL-CCNC: 16 U/L (ref 10–38)
BILIRUB SERPL-MCNC: 1.2 MG/DL (ref 0.2–1)
BUN SERPL-MCNC: 15 MG/DL (ref 7–18)
BUN/CREAT SERPL: 15 (ref 12–20)
CALCIUM SERPL-MCNC: 10.1 MG/DL (ref 8.5–10.1)
CHLORIDE SERPL-SCNC: 104 MMOL/L (ref 100–111)
CHOLEST SERPL-MCNC: 169 MG/DL
CO2 SERPL-SCNC: 31 MMOL/L (ref 21–32)
CREAT SERPL-MCNC: 0.99 MG/DL (ref 0.6–1.3)
CREAT UR-MCNC: 73 MG/DL (ref 30–125)
EST. AVERAGE GLUCOSE BLD GHB EST-MCNC: 134 MG/DL
GLOBULIN SER CALC-MCNC: 3.2 G/DL (ref 2–4)
GLUCOSE SERPL-MCNC: 120 MG/DL (ref 74–99)
HBA1C MFR BLD: 6.3 % (ref 4.2–5.6)
HDLC SERPL-MCNC: 50 MG/DL (ref 40–60)
HDLC SERPL: 3.4 {RATIO} (ref 0–5)
LDLC SERPL CALC-MCNC: 106.4 MG/DL (ref 0–100)
LIPID PROFILE,FLP: ABNORMAL
MICROALBUMIN UR-MCNC: 15.7 MG/DL (ref 0–3)
MICROALBUMIN/CREAT UR-RTO: 215 MG/G (ref 0–30)
POTASSIUM SERPL-SCNC: 4.2 MMOL/L (ref 3.5–5.5)
PROT SERPL-MCNC: 7.5 G/DL (ref 6.4–8.2)
SODIUM SERPL-SCNC: 139 MMOL/L (ref 136–145)
TRIGL SERPL-MCNC: 63 MG/DL (ref ?–150)
VLDLC SERPL CALC-MCNC: 12.6 MG/DL

## 2022-05-12 PROCEDURE — G8754 DIAS BP LESS 90: HCPCS | Performed by: FAMILY MEDICINE

## 2022-05-12 PROCEDURE — 3044F HG A1C LEVEL LT 7.0%: CPT | Performed by: FAMILY MEDICINE

## 2022-05-12 PROCEDURE — 82043 UR ALBUMIN QUANTITATIVE: CPT

## 2022-05-12 PROCEDURE — 36415 COLL VENOUS BLD VENIPUNCTURE: CPT

## 2022-05-12 PROCEDURE — 83036 HEMOGLOBIN GLYCOSYLATED A1C: CPT

## 2022-05-12 PROCEDURE — G8536 NO DOC ELDER MAL SCRN: HCPCS | Performed by: FAMILY MEDICINE

## 2022-05-12 PROCEDURE — 80053 COMPREHEN METABOLIC PANEL: CPT

## 2022-05-12 PROCEDURE — G0463 HOSPITAL OUTPT CLINIC VISIT: HCPCS | Performed by: FAMILY MEDICINE

## 2022-05-12 PROCEDURE — 80061 LIPID PANEL: CPT

## 2022-05-12 PROCEDURE — G8419 CALC BMI OUT NRM PARAM NOF/U: HCPCS | Performed by: FAMILY MEDICINE

## 2022-05-12 PROCEDURE — 1101F PT FALLS ASSESS-DOCD LE1/YR: CPT | Performed by: FAMILY MEDICINE

## 2022-05-12 PROCEDURE — G8510 SCR DEP NEG, NO PLAN REQD: HCPCS | Performed by: FAMILY MEDICINE

## 2022-05-12 PROCEDURE — 86803 HEPATITIS C AB TEST: CPT

## 2022-05-12 PROCEDURE — 99214 OFFICE O/P EST MOD 30 MIN: CPT | Performed by: FAMILY MEDICINE

## 2022-05-12 PROCEDURE — G8427 DOCREV CUR MEDS BY ELIG CLIN: HCPCS | Performed by: FAMILY MEDICINE

## 2022-05-12 PROCEDURE — G8753 SYS BP > OR = 140: HCPCS | Performed by: FAMILY MEDICINE

## 2022-05-12 RX ORDER — VALSARTAN 320 MG/1
TABLET ORAL
Qty: 90 TABLET | Refills: 3 | Status: SHIPPED | OUTPATIENT
Start: 2022-05-12

## 2022-05-12 NOTE — PROGRESS NOTES
HPI:  Laura Leon is a 67 y.o. male who presents today with   Chief Complaint   Patient presents with    Hypertension    Cholesterol Problem    Diabetes      Pt has had some elevated BPs at home. His BP is elevated today    He still has not gotten his prosthetic;  Discussed with pt that the specifics needed for the letter should come directly from the [facility] ophthalmology regularly. Has diabetes; needs some   labs for diabetes;            3 most recent PHQ Screens 5/12/2022   PHQ Not Done -   Little interest or pleasure in doing things Not at all   Feeling down, depressed, irritable, or hopeless Not at all   Total Score PHQ 2 0               PMH,  Meds, Allergies, Family History, Social history reviewed      Current Outpatient Medications   Medication Sig Dispense Refill    valsartan (DIOVAN) 320 mg tablet TAKE 1 TABLET BY MOUTH EVERY DAY 90 Tablet 3    hydroCHLOROthiazide (MICROZIDE) 12.5 mg capsule TAKE 1 CAPSULE BY MOUTH DAILY 90 Capsule 3    prednisoLONE acetate (PRED FORTE) 1 % ophthalmic suspension       atropine 1 % ophthalmic solution       pravastatin (PRAVACHOL) 40 mg tablet TAKE 1 TABLET BY MOUTH EVERY DAY 90 Tablet 3    MULTIVITAMINS (MULTI-VITAMIN PO) Take  by mouth.           No Known Allergies               ROS  As per HPI      Visit Vitals  BP (!) 152/62   Pulse 81   Temp 97.5 °F (36.4 °C) (Temporal)   Resp 18   Ht 5' 5\" (1.651 m)   Wt 163 lb (73.9 kg)   SpO2 97%   BMI 27.12 kg/m²     Physical Exam   General appearance: alert, cooperative, no distress, appears stated age  Neck: supple, symmetrical, trachea midline, no adenopathy, thyroid: not enlarged, symmetric, no tenderness/mass/nodules, no carotid bruit and no JVD  Lungs: clear to auscultation bilaterally  Heart: regular rate and rhythm, S1, S2 normal, no murmur, click, rub or gallop  Extremities: extremities normal, atraumatic, no cyanosis or edema      Assessment/Plan:    Diagnoses and all orders for this visit:    1. HTN (hypertension), benign  -     LIPID PANEL; Future  -     METABOLIC PANEL, COMPREHENSIVE; Future    2. Type 2 diabetes with nephropathy (HCC)  -     LIPID PANEL; Future  -     METABOLIC PANEL, COMPREHENSIVE; Future  -     HEMOGLOBIN A1C WITH EAG; Future    Other orders  -     valsartan (DIOVAN) 320 mg tablet; TAKE 1 TABLET BY MOUTH EVERY DAY         as above   treatment plan as listed below  Orders Placed This Encounter    LIPID PANEL    METABOLIC PANEL, COMPREHENSIVE    HEMOGLOBIN A1C WITH EAG    valsartan (DIOVAN) 320 mg tablet     Increase diovan to 320 mg daily  Will assist pt with obtaining his new prostethesis    Follow-up and Dispositions    · Return in about 4 weeks (around 6/9/2022) for htn, Chol.             Radha Long MD

## 2022-05-12 NOTE — PATIENT INSTRUCTIONS

## 2022-05-12 NOTE — PROGRESS NOTES
1. \"Have you been to the ER, urgent care clinic since your last visit? Hospitalized since your last visit? \" No    2. \"Have you seen or consulted any other health care providers outside of the [address] [place] [address] since your last visit? \" No     3. For patients aged 39-70: Has the patient had a colonoscopy / FIT/ Cologuard? NA - based on age      If the patient is female:    4. For patients aged 41-77: Has the patient had a mammogram within the past 2 years? NA - based on age or sex      11. For patients aged 21-65: Has the patient had a pap smear?  NA - based on age or sex

## 2022-05-13 LAB
HCV AB SER IA-ACNC: 0.06 INDEX
HCV AB SERPL QL IA: NEGATIVE
HCV COMMENT,HCGAC: NORMAL

## 2022-09-14 ENCOUNTER — HOSPITAL ENCOUNTER (OUTPATIENT)
Dept: LAB | Age: 77
Discharge: HOME | End: 2022-09-14
Payer: MEDICARE

## 2022-09-14 ENCOUNTER — OFFICE VISIT (OUTPATIENT)
Dept: FAMILY MEDICINE CLINIC | Age: 77
End: 2022-09-14
Payer: MEDICARE

## 2022-09-14 VITALS
HEART RATE: 66 BPM | WEIGHT: 154.8 LBS | HEIGHT: 65 IN | BODY MASS INDEX: 25.79 KG/M2 | RESPIRATION RATE: 16 BRPM | TEMPERATURE: 97.3 F | SYSTOLIC BLOOD PRESSURE: 111 MMHG | DIASTOLIC BLOOD PRESSURE: 69 MMHG | OXYGEN SATURATION: 97 %

## 2022-09-14 DIAGNOSIS — Z00.00 MEDICARE ANNUAL WELLNESS VISIT, SUBSEQUENT: Primary | ICD-10-CM

## 2022-09-14 DIAGNOSIS — I10 HTN (HYPERTENSION), BENIGN: ICD-10-CM

## 2022-09-14 DIAGNOSIS — E11.21 TYPE 2 DIABETES WITH NEPHROPATHY (HCC): ICD-10-CM

## 2022-09-14 DIAGNOSIS — Z89.619 S/P AKA (ABOVE KNEE AMPUTATION) UNILATERAL (HCC): ICD-10-CM

## 2022-09-14 LAB
ALBUMIN SERPL-MCNC: 4.1 G/DL (ref 3.4–5)
ALBUMIN/GLOB SERPL: 1.2 {RATIO} (ref 0.8–1.7)
ALP SERPL-CCNC: 57 U/L (ref 45–117)
ALT SERPL-CCNC: 45 U/L (ref 16–61)
ANION GAP SERPL CALC-SCNC: 2 MMOL/L (ref 3–18)
AST SERPL-CCNC: 22 U/L (ref 10–38)
BILIRUB SERPL-MCNC: 1.3 MG/DL (ref 0.2–1)
BUN SERPL-MCNC: 18 MG/DL (ref 7–18)
BUN/CREAT SERPL: 19 (ref 12–20)
CALCIUM SERPL-MCNC: 10.1 MG/DL (ref 8.5–10.1)
CHLORIDE SERPL-SCNC: 107 MMOL/L (ref 100–111)
CHOLEST SERPL-MCNC: 143 MG/DL
CO2 SERPL-SCNC: 32 MMOL/L (ref 21–32)
CREAT SERPL-MCNC: 0.96 MG/DL (ref 0.6–1.3)
EST. AVERAGE GLUCOSE BLD GHB EST-MCNC: 143 MG/DL
GLOBULIN SER CALC-MCNC: 3.3 G/DL (ref 2–4)
GLUCOSE SERPL-MCNC: 120 MG/DL (ref 74–99)
HBA1C MFR BLD: 6.6 % (ref 4.2–5.6)
HDLC SERPL-MCNC: 45 MG/DL (ref 40–60)
HDLC SERPL: 3.2 {RATIO} (ref 0–5)
LDLC SERPL CALC-MCNC: 83.4 MG/DL (ref 0–100)
LIPID PROFILE,FLP: NORMAL
POTASSIUM SERPL-SCNC: 4.4 MMOL/L (ref 3.5–5.5)
PROT SERPL-MCNC: 7.4 G/DL (ref 6.4–8.2)
SODIUM SERPL-SCNC: 141 MMOL/L (ref 136–145)
TRIGL SERPL-MCNC: 73 MG/DL (ref ?–150)
VLDLC SERPL CALC-MCNC: 14.6 MG/DL

## 2022-09-14 PROCEDURE — G8427 DOCREV CUR MEDS BY ELIG CLIN: HCPCS | Performed by: FAMILY MEDICINE

## 2022-09-14 PROCEDURE — 36415 COLL VENOUS BLD VENIPUNCTURE: CPT

## 2022-09-14 PROCEDURE — 83036 HEMOGLOBIN GLYCOSYLATED A1C: CPT

## 2022-09-14 PROCEDURE — G8754 DIAS BP LESS 90: HCPCS | Performed by: FAMILY MEDICINE

## 2022-09-14 PROCEDURE — G8752 SYS BP LESS 140: HCPCS | Performed by: FAMILY MEDICINE

## 2022-09-14 PROCEDURE — G8536 NO DOC ELDER MAL SCRN: HCPCS | Performed by: FAMILY MEDICINE

## 2022-09-14 PROCEDURE — 3044F HG A1C LEVEL LT 7.0%: CPT | Performed by: FAMILY MEDICINE

## 2022-09-14 PROCEDURE — G8510 SCR DEP NEG, NO PLAN REQD: HCPCS | Performed by: FAMILY MEDICINE

## 2022-09-14 PROCEDURE — 1124F ACP DISCUSS-NO DSCNMKR DOCD: CPT | Performed by: FAMILY MEDICINE

## 2022-09-14 PROCEDURE — 80053 COMPREHEN METABOLIC PANEL: CPT

## 2022-09-14 PROCEDURE — 1101F PT FALLS ASSESS-DOCD LE1/YR: CPT | Performed by: FAMILY MEDICINE

## 2022-09-14 PROCEDURE — G0439 PPPS, SUBSEQ VISIT: HCPCS | Performed by: FAMILY MEDICINE

## 2022-09-14 PROCEDURE — 80061 LIPID PANEL: CPT

## 2022-09-14 PROCEDURE — G8417 CALC BMI ABV UP PARAM F/U: HCPCS | Performed by: FAMILY MEDICINE

## 2022-09-14 RX ORDER — NETARSUDIL 0.2 MG/ML
SOLUTION/ DROPS OPHTHALMIC; TOPICAL
COMMUNITY
Start: 2022-09-08

## 2022-09-14 RX ORDER — BIMATOPROST 0.1 MG/ML
SOLUTION/ DROPS OPHTHALMIC
COMMUNITY
Start: 2022-09-02

## 2022-09-14 RX ORDER — DORZOLAMIDE HCL 20 MG/ML
SOLUTION/ DROPS OPHTHALMIC
COMMUNITY
Start: 2022-08-26

## 2022-09-14 NOTE — PATIENT INSTRUCTIONS
Medicare Wellness Visit, Male    The best way to live healthy is to have a lifestyle where you eat a well-balanced diet, exercise regularly, limit alcohol use, and quit all forms of tobacco/nicotine, if applicable. Regular preventive services are another way to keep healthy. Preventive services (vaccines, screening tests, monitoring & exams) can help personalize your care plan, which helps you manage your own care. Screening tests can find health problems at the earliest stages, when they are easiest to treat. [facility] follows the current, evidence-based guidelines published by the [facility] ([facility]) when recommending preventive services for our patients. Because we follow these guidelines, sometimes recommendations change over time as research supports it. (For example, a prostate screening blood test is no longer routinely recommended for men with no symptoms). Of course, you and your doctor may decide to screen more often for some diseases, based on your risk and co-morbidities (chronic disease you are already diagnosed with). Preventive services for you include:  - Medicare offers their members a free annual wellness visit, which is time for you and your primary care provider to discuss and plan for your preventive service needs. Take advantage of this benefit every year!  -All adults over age 72 should receive the recommended pneumonia vaccines. Current USPSTF guidelines recommend a series of two vaccines for the best pneumonia protection.   -All adults should have a flu vaccine yearly and tetanus vaccine every 10 years.  -All adults age 48 and older should receive the shingles vaccines (series of two vaccines).        -All adults age 38-68 who are overweight should have a diabetes screening test once every three years.   -Other screening tests & preventive services for persons with diabetes include: an eye exam to screen for diabetic retinopathy, a kidney function test, a foot exam, and stricter control over your cholesterol.   -Cardiovascular screening for adults with routine risk involves an electrocardiogram (ECG) at intervals determined by the provider.   -Colorectal cancer screening should be done for adults age 54-65 with no increased risk factors for colorectal cancer. There are a number of acceptable methods of screening for this type of cancer. Each test has its own benefits and drawbacks. Discuss with your provider what is most appropriate for you during your annual wellness visit. The different tests include: colonoscopy (considered the best screening method), a fecal occult blood test, a fecal DNA test, and sigmoidoscopy.  -All adults born between [facility] should be screened once for Hepatitis C.  -An Abdominal Aortic Aneurysm (AAA) Screening is recommended for men age 73-68 who has ever smoked in their lifetime.      Here is a list of your current Health Maintenance items (your personalized list of preventive services) with a due date:  Health Maintenance Due   Topic Date Due    Pneumococcal Vaccine (1 - PCV) Never done    Shingles Vaccine (1 of 2) Never done    Diabetic Foot Care  07/12/2018    Eye Exam  05/17/2019    COVID-19 Vaccine (3 - Booster for Nicole Barrs series) 08/27/2021    Yearly Flu Vaccine (1) Never done    Annual Well Visit  09/09/2022

## 2022-09-14 NOTE — PROGRESS NOTES
1. \"Have you been to the ER, urgent care clinic since your last visit? Hospitalized since your last visit? \" No    2. \"Have you seen or consulted any other health care providers outside of the [address] [place] [address] since your last visit? \" No     3. For patients aged 39-70: Has the patient had a colonoscopy / FIT/ Cologuard?  NA - based on age

## 2022-09-14 NOTE — PROGRESS NOTES
This is the Subsequent Medicare Annual Wellness Exam, performed 12 months or more after the Initial AWV or the last Subsequent AWV    I have reviewed the patient's medical history in detail and updated the computerized patient record. Pt has an AKA  He is using his walker ; he is not using his scooter as much; needs his battery charged. He is still getting his prosthesis managed. Assessment/Plan   Education and counseling provided:  Are appropriate based on today's review and evaluation  End-of-Life planning (with patient's consent)    1. Medicare annual wellness visit, subsequent  2. HTN (hypertension), benign  -     LIPID PANEL; Future  -     METABOLIC PANEL, COMPREHENSIVE; Future  3. Type 2 diabetes with nephropathy (HCC)  -     HEMOGLOBIN A1C WITH EAG; Future  4. S/P AKA (above knee amputation) unilateral (HCC)  -     REFERRAL TO PHYSICAL THERAPY     As above   treatment plan as listed below  Orders Placed This Encounter    LIPID PANEL    METABOLIC PANEL, COMPREHENSIVE    HEMOGLOBIN A1C WITH EAG    [facility] [place] [facility]    Rhopressa 0.02 % drop    dorzolamide (TRUSOPT) 2 % ophthalmic solution    Lumigan 0.01 % ophthalmic drops     Meds reconciled  Follow-up and Dispositions    Return in about 6 months (around 3/14/2023) for htn, diabetes. This has been fully explained to the patient, who indicates understanding. An After Visit Summary was printed and given to the patient.     Depression Risk Factor Screening     3 most recent PHQ Screens 9/14/2022   PHQ Not Done -   Little interest or pleasure in doing things Not at all   Feeling down, depressed, irritable, or hopeless Not at all   Total Score PHQ 2 0       Alcohol & Drug Abuse Risk Screen    Do you average more than 1 drink per night or more than 7 drinks a week: No    In the past three months have you have had more than 4 drinks containing alcohol on one occasion: No          Functional Ability and Level of Safety    Hearing: Hearing is good. Activities of Daily Living: The home contains: grab bars  Patient does total self care      Ambulation: with difficulty, uses a walker and scooter and cane     Fall Risk:  Fall Risk Assessment, last 12 mths 5/12/2022   Able to walk?  Yes   Fall in past 12 months? 0   Do you feel unsteady? -   Are you worried about falling -      Abuse Screen:  Patient is not abused       Cognitive Screening    Has your family/caregiver stated any concerns about your memory: no     Cognitive Screening: cognition is intact    Health Maintenance Due     Health Maintenance Due   Topic Date Due    Pneumococcal 65+ years (1 - PCV) Never done    Shingrix Vaccine Age 50> (1 of 2) Never done    Foot Exam Q1  07/12/2018    Eye Exam Retinal or Dilated  05/17/2019    COVID-19 Vaccine (3 - Booster for Climmie Messier series) 08/27/2021    Flu Vaccine (1) Never done       Patient Care Team   Patient Care Team:  Lenora Medina MD as PCP - General  Lenora Medina MD as PCP - REHABILITATION [facility] Empaneled Provider  Heidi Ferro MD (Podiatry)  Elisha Worrell MD (Ophthalmology)      PE:  Visit Vitals  /69 (BP 1 Location: Right upper arm, BP Patient Position: Sitting, BP Cuff Size: Adult)   Pulse 66   Temp 97.3 °F (36.3 °C) (Temporal)   Resp 16   Ht 5' 5\" (1.651 m)   Wt 154 lb 12.8 oz (70.2 kg)   SpO2 97%   BMI 25.76 kg/m²     General appearance: alert, cooperative, no distress, appears stated age    Lungs: clear to auscultation bilaterally  Extremities: Right LE normal, atraumatic, no cyanosis or edema; left AKA + prosthesis        Lab Results   Component Value Date/Time    Sodium 139 05/12/2022 01:27 PM    Potassium 4.2 05/12/2022 01:27 PM    Chloride 104 05/12/2022 01:27 PM    CO2 31 05/12/2022 01:27 PM    Anion gap 4 05/12/2022 01:27 PM    Glucose 120 (H) 05/12/2022 01:27 PM    BUN 15 05/12/2022 01:27 PM    Creatinine 0.99 05/12/2022 01:27 PM    BUN/Creatinine ratio 15 05/12/2022 01:27 PM    GFR est AA >60 05/12/2022 01:27 PM    GFR est non-AA >60 05/12/2022 01:27 PM    Calcium 10.1 05/12/2022 01:27 PM    Bilirubin, total 1.2 (H) 05/12/2022 01:27 PM    Alk.  phosphatase 60 05/12/2022 01:27 PM    Protein, total 7.5 05/12/2022 01:27 PM    Albumin 4.3 05/12/2022 01:27 PM    Globulin 3.2 05/12/2022 01:27 PM    A-G Ratio 1.3 05/12/2022 01:27 PM    ALT (SGPT) 35 05/12/2022 01:27 PM    AST (SGOT) 16 05/12/2022 01:27 PM       History     Patient Active Problem List   Diagnosis Code    Gout M10.9    PVD (peripheral vascular disease) (HCC) I73.9    Hypercholesterolemia E78.00    Arthritis M19.90    Cataract H26.9    DM (diabetes mellitus) ([facility] E11.9    HTN (hypertension), benign I10    Advanced directives, counseling/discussion Z71.89    Type 2 diabetes with nephropathy ([facility] E11.21     Past Medical History:   Diagnosis Date    Arthritis     hands    Cataract     left eye    Diabetes ([address]     diet controlled    DM (diabetes mellitus) ([address] 2/3/2010    HTN (hypertension), benign     Hypercholesterolemia     PVD (peripheral vascular disease) ([facility] 2/3/2010      Past Surgical History:   Procedure Laterality Date    HX AMPUTATION      left leg aka prosthesis due to PVD    [place] CARDIAC SURG PROCEDURE UNLIST  1995    left fem/pop bypass    [place] CARDIAC SURG PROCEDURE UNLIST  1997    occlusion bypass angiographic thrombolysis failed    [place] CARDIAC SURG PROCEDURE UNLIST      s/p left illiac/pop     Current Outpatient Medications   Medication Sig Dispense Refill    Rhopressa 0.02 % drop       dorzolamide (TRUSOPT) 2 % ophthalmic solution INSTILL 1 DROP INTO LEFT EYE TWICE A DAY      Lumigan 0.01 % ophthalmic drops INSTILL 1 DROP INTO LEFT EYE AT BEDTIME      valsartan (DIOVAN) 320 mg tablet TAKE 1 TABLET BY MOUTH EVERY DAY 90 Tablet 3    hydroCHLOROthiazide (MICROZIDE) 12.5 mg capsule TAKE 1 CAPSULE BY MOUTH DAILY 90 Capsule 3    prednisoLONE acetate (PRED FORTE) 1 % ophthalmic suspension       atropine 1 % ophthalmic solution       pravastatin (PRAVACHOL) 40 mg tablet TAKE 1 TABLET BY MOUTH EVERY DAY 90 Tablet 3    MULTIVITAMINS (MULTI-VITAMIN PO) Take  by mouth.        No Known Allergies    Family History   Problem Relation Age of Onset    Hypertension Father      Social History     Tobacco Use    Smoking status: Every Day     Packs/day: 0.50     Years: 59.00     Pack years: 29.50     Types: Cigarettes     Start date: 1961    Smokeless tobacco: Never   Substance Use Topics    Alcohol use: No         Olene Jorge

## 2022-10-03 ENCOUNTER — HOSPITAL ENCOUNTER (OUTPATIENT)
Dept: PHYSICAL THERAPY | Age: 77
Discharge: HOME | End: 2022-10-03
Payer: MEDICARE

## 2022-10-03 PROCEDURE — 97110 THERAPEUTIC EXERCISES: CPT

## 2022-10-03 PROCEDURE — 97161 PT EVAL LOW COMPLEX 20 MIN: CPT

## 2022-10-03 PROCEDURE — 97535 SELF CARE MNGMENT TRAINING: CPT

## 2022-10-03 NOTE — PROGRESS NOTES
In [address] [place] [address] Square  [facility], [address] [place] [address], [address] [place], [place] [address]y [address]  [phone] fax      Plan of Care/ Statement of Necessity for Physical Therapy Services    Patient name: Geri Santizo Start of Care: 10/3/2022   Referral source: Sam Sefl MD : 1945    Medical Diagnosis: Other abnormalities of gait and mobility [R26.89]  Imbalance [R26.89]  Payor: Derek Bella / Plan: [place] MEDICARE PART A & B / Product Type: Medicare /  Onset Date:   Treatment Diagnosis: gait abnormality, balance impairment   Prior Hospitalization: see medical history Provider#: [id number]   Medications: Verified on Patient summary List    Comorbidities:  HTN, left AKA 2000 due to poor circulation   Prior Level of Function:  prior to getting the first new socket in  was able to drive, cut his grass, load and unload power scooter by himself , no AD in the home, but used right SC for mobility, I all areas of ADLS and activities. Since receiving the new socket in  has been UA to use the cane or cut the grass, but still able to drive and use SW      The Plan of Care and following information is based on the information from the initial evaluation. Assessment/ key information:  64 YO male diagnosed as above and with S/S consistent with above diagnosis presents to skilled outpatient PT [facility] mobility issues since getting new AKA socket in  and . Denies pain. Reports frustration with poor socket fit and knee hinge being too tight affecting his functional mobility, PLOF and quality of life.   Evaluation Complexity History HIGH Complexity :3+ comorbidities / personal factors will impact the outcome/ POC ; Examination MEDIUM Complexity : 3 Standardized tests and measures addressing body structure, function, activity limitation and / or participation in recreation  ;Presentation LOW Complexity : Stable, uncomplicated  ;Clinical Decision Making MEDIUM Complexity : FOTO score of 26-74  Overall Complexity Rating: LOW   Problem List: decrease ROM, decrease strength, impaired gait/ balance, decrease ADL/ functional abilitiies, decrease activity tolerance, decrease flexibility/ joint mobility, decrease transfer abilities, and other FOTO 34    Treatment Plan may include any combination of the following: Therapeutic exercise, Therapeutic activities, Neuromuscular re-education, Gait/balance training, Manual therapy, Patient education, Self Care training, Functional mobility training, Home safety training, and Stair training  Patient / Family readiness to learn indicated by: asking questions, trying to perform skills, and interest  Persons(s) to be included in education: patient (P)  Barriers to Learning/Limitations: None  Patient Goal (s):  improved walking and standing and getting around in the house, get back to using a cane  Patient Self Reported Health Status: good  Rehabilitation Potential: good    Short Term Goals: To be accomplished in 4 treatments:   1 patient will have established and be I with HEP to aid with self management at discharge   EVAL issued HEP   2 patient will tolerate RW ambulation 190'X2 for carryover to community ambulation and at home activities   EVAL SW and RW 40-50' S  Long Term Goals: To be accomplished in 8 treatments:   1 patient will ambulate with right ' SBA for carryover to at home and community activities   EVAL 40-50' SW and RW S   2 patient will have FOTO improved to projected gains of 61 to show significant gains with activities and ADLS at home and in the communtiy   EVAL 34   3 patient will negotiate 6' stairs 2x step to pattern with cane and rail for carryover to home and community   EVAL reports UA  Frequency / Duration: Patient to be seen 2 times per week for 4 weeks.     Patient/ Caregiver education and instruction: Diagnosis, prognosis, self care, activity modification, and exercises   [x]  Plan of care has been reviewed with PTA    Certification Period: 10/3/22 - 11/1/22  Anthony Bone, PT 10/3/2022 2:08 PM    ________________________________________________________________________    I certify that the above Therapy Services are being furnished while the patient is under my care. I agree with the treatment plan and certify that this therapy is necessary.     [de-identified] Signature:____________Date:_________TIME:________     Edinson Baker MD  ** Signature, Date and Time must be completed for valid certification **    Please sign and return to In [address] [place] [address] Drive Square  [address] [place] [address], [address] [place] [address], [address] [place] [address],[facility] 300  [phone] fax

## 2022-10-03 NOTE — PROGRESS NOTES
PT DAILY TREATMENT NOTE/AMPUTEE JV     Patient Name: Samantha Pickering  Date:10/3/2022  : 1945  [x]  Patient  Verified  Payor: Pat Rodriguez / Plan: [place] MED[place]ARE PART A & B / Product Type: Medicare /    In time:130  Out time:230  Total Treatment Time (min): 60  Visit #: 1 of 8    Medicare/BCBS Only   Total Timed Codes (min):  23 1:1 Treatment Time:  23     Treatment Area: Other abnormalities of gait and mobility [R26.89]  Imbalance [R26.89]    SUBJECTIVE  Pain Level (0-10 scale): 0  []constant []intermittent []improving []worsening []no change since onset    Any medication changes, allergies to medications, adverse drug reactions, diagnosis change, or new procedure performed?: [x] No    [] Yes (see summary sheet for update)  Subjective functional status/changes:     PLOF: prior to getting the first new socket in  was able to drive, cut his grass, load and unload power scooter by himself , no AD in the home, but used right SC for mobility, I all areas of ADLS and activities. Since receiving the new socket in  has been UA to use the cane or cut the grass, but still able to drive and use SW  Limitations to PLOF: getting used to new socket on prosthesis and reports it needs more adjustments. Mechanism of Injury: new socket for the left AKA prosthesis in , 22. Both of these new sockets have affected his PLOF. Current symptoms/Complaints: 66 YO male diagnosed as above and with S/S consistent with above diagnosis presents to skilled outpatient PT [facility] mobility issues since getting new AKA socket in  and . Denies pain.    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  Work Hx: retired  Living Situation: lives in a 1 story house, no ARANZA, not alone  Pt Goals: improved walking and standing and getting around in the house, get back to using a cane  Barriers: []pain []financial []time []transportation []other  Motivation: good  Substance use: []Alcohol [x]Tobacco []other:   FABQ Score: []low []elevate  Cognition: A & O x 4    Other:    OBJECTIVE/EXAMINATION  Domestic Life: household and community activities, drive,   Activity/Recreational Limitations: poor fit of socket  Mobility: mod I with  SW   Self Care: I        :     27 min [x]Eval                  []Re-Eval       15 min Therapeutic Exercise:  [x] See flow sheet :   Rationale: increase ROM, increase strength, improve coordination, improve balance, and increase proprioception to improve the patients ability to tolerate ADLs and activities  8 min Self Care/Home Management: HEP, POC, GOALS, FOTO, anatomy   Rationale: increase strength, improve coordination, improve balance, and increase proprioception  to improve the patients ability to tolerate ADLs and activities        .        With   [] TE   [] TA   [] neuro   [] other: Patient Education: [x] Review HEP    [] Progressed/Changed HEP based on:   [] positioning   [] body mechanics   [] transfers   [] heat/ice application    [] other:      Other Objective/Functional Measures:      Sit to stands  5x From chair with min A of PT on right under axilla and  patient pushing up walker with B UE    53 seconds  TUG   with SW and SBA of PT 1:01    SW 40-50'  S  RW 40-50' S / SBA     Sit to stand min A from chair with AD    Physical Therapy Lower Extremity Amputee Evaluation    Level of Amputation: [] Right [x] Left  Level:AKA  Length of Residual Limb:  Appearance:    Circumference (cm): Ischial Tub (AK):     Tibial Tub (BK):     At Bone End:    ROM   MMT (0-5)    Right Left  Right Left   WFL  Hip Flex 4 4+     Hip Ext       Hip Abd 5      Hip Add 5      Hip IR       Hip ER     130 sititng  Knee Flex 5    0  Knee Ext 5      Ankle DF       Ankle PF       Transfers:  Bed Mobility: reports I  Balance Static Sitting:  Good Dynamic Sitting: good    Static Standing:   fair  Dynamic Standing:  fair  ADLs: I  Gait: MOD I SW  Prosthesis: left  AKA  I with donning and doffing  Additional Equipment: SC at home and scooter chair  Other:      Pain Level (0-10 scale) post treatment: 0    ASSESSMENT/Changes in Function: Patient demonstrates the potential to make gains with improved ROM, strength, endurance/activity tolerance, functional FOTO survey score   and all within a reasonable time frame so as to increase their functional independence with ADLs and activities for carryover to  improved quality of life, increase . Patient requires skilled Physical Therapy so as to monitor their response to and modify their treatment plan accordingly. Patient appears to be an appropriate candidate for skilled outpatient Physical Therapy. Patient will continue to benefit from skilled PT services to modify and progress therapeutic interventions, address functional mobility deficits, address ROM deficits, address strength deficits, analyze and address soft tissue restrictions, analyze and cue movement patterns, analyze and modify body mechanics/ergonomics, assess and modify postural abnormalities, address imbalance/dizziness, and instruct in home and community integration to attain remaining goals.      [x]  See Plan of Care  []  See progress note/recertification  []  See Discharge Summary         Progress towards goals / Updated goals:       PLAN  [x]  Upgrade activities as tolerated     [x]  Continue plan of care  []  Update interventions per flow sheet       []  Discharge due to:_  []  Other:_      Katty Jiang, PT 10/3/2022  1:38 PM

## 2022-10-13 ENCOUNTER — APPOINTMENT (OUTPATIENT)
Dept: PHYSICAL THERAPY | Age: 77
End: 2022-10-13
Payer: MEDICARE

## 2022-10-18 ENCOUNTER — HOSPITAL ENCOUNTER (OUTPATIENT)
Dept: PHYSICAL THERAPY | Age: 77
Discharge: HOME | End: 2022-10-18
Payer: MEDICARE

## 2022-10-18 PROCEDURE — 97116 GAIT TRAINING THERAPY: CPT

## 2022-10-18 PROCEDURE — 97112 NEUROMUSCULAR REEDUCATION: CPT

## 2022-10-18 PROCEDURE — 97530 THERAPEUTIC ACTIVITIES: CPT

## 2022-10-18 PROCEDURE — 97110 THERAPEUTIC EXERCISES: CPT

## 2022-10-18 NOTE — PROGRESS NOTES
PT DAILY TREATMENT NOTE     Patient Name: Kelsey Scott  Date:10/18/2022  : 1945  [x]  Patient  Verified  Payor: [place] MEDICARE / Plan: [place] MEDICARE PART A & B / Product Type: Medicare /    In time:130  Out time:223  Total Treatment Time (min): 53  Visit #: 2 of 8    Medicare/BCBS Only   Total Timed Codes (min):  53 1:1 Treatment Time:  53       Treatment Area: Other abnormalities of gait and mobility [R26.89]  Imbalance [R26.89]    SUBJECTIVE  Pain Level (0-10 scale): 0  Any medication changes, allergies to medications, adverse drug reactions, diagnosis change, or new procedure performed?: [x] No    [] Yes (see summary sheet for update)  Subjective functional status/changes:   [] No changes reported  \"I am using 1 cane at home. \"  Reports he does exercises on the floor without his prosthesis on. OBJECTIVE          20 min Therapeutic Exercise:  [x] See flow sheet :   Rationale: increase ROM, increase strength, improve coordination, improve balance, and increase proprioception to improve the patients ability to tolerate ADLS and activities    8 min Therapeutic Activity:  [x]  See flow sheet :   Rationale: increase ROM, increase strength, improve coordination, improve balance, and increase proprioception  to improve the patients ability to tolerate ADLS and activities     8 min Neuromuscular Re-education:  []  See flow sheet :   Rationale: increase ROM, increase strength, improve coordination, improve balance, and increase proprioception  to improve the patients ability to tolerate ADLS and activities        17 min Gait Training:  _40-60 x2__ feet with _SW__ device on level surfaces with _SBA/S__ level of assist  25' with right SC and left HHA moderate pressure  25' with SBQC and left HHA minimal to moderate pressure  No LOB with any of the trial,   Requires VC for sequencing and to increase step length left LE       Rationale:              With   [x] TE   [x] TA   [x] neuro   [] other: Patient Education: [x] Review HEP    [x] Progressed/Changed HEP based on:   [] positioning   [] body mechanics   [] transfers   [] heat/ice application    [] other:      Other Objective/Functional Measures: VC exercises and technique  Decrease tolerance to left march in standing      Pain Level (0-10 scale) post treatment: 0    ASSESSMENT/Changes in Function: first full day back and tolerated well. Multiple C/O ill fitting socket affecting his gait pattern. Reports using just 1 cane at home with some challenges. Patient will continue to benefit from skilled PT services to modify and progress therapeutic interventions, address functional mobility deficits, address ROM deficits, address strength deficits, analyze and address soft tissue restrictions, analyze and cue movement patterns, analyze and modify body mechanics/ergonomics, assess and modify postural abnormalities, address imbalance/dizziness, and instruct in home and community integration to attain remaining goals. [x]  See Plan of Care  []  See progress note/recertification  []  See Discharge Summary         Progress towards goals / Updated goals:   Short Term Goals: To be accomplished in 4 treatments:              1 patient will have established and be I with HEP to aid with self management at discharge              EVAL issued HEP  CURRENT reports compliance with exercises at home daily 10/18/22              2 patient will tolerate RW ambulation 190'X2 for carryover to community ambulation and at home activities              EVAL SW and RW 40-50' S  CURRENT SW 40-60', SBA/S, right SC and SBQC 25' each with left HHA  10/18/22  Long Term Goals:  To be accomplished in 8 treatments:              1 patient will ambulate with right ' SBA for carryover to at home and community activities              EVAL 40-50' SW and RW S  CURRENT SW 40-60', SBA/S, right SC and SBQC 25' each with left HHA  10/18/22              2 patient will have FOTO improved to projected gains of 61 to show significant gains with activities and ADLS at home and in the communtiy              EVAL 34  CURRENT ongoing NA 10/18/22              3 patient will negotiate 6' stairs 2x step to pattern with cane and rail for carryover to home and community              EVAL reports UA  CURRENT ongoing NA 10/18/22    PLAN  [x]  Upgrade activities as tolerated     [x]  Continue plan of care  []  Update interventions per flow sheet       []  Discharge due to:_  []  Other:_      London Armijo, PT 10/18/2022  1:33 PM    Future Appointments   Date Time Provider Karan Do   10/20/2022  1:30 PM Yvonne Lua, PTA [facility]   10/25/2022  1:30 PM Laura Ayers, PT [facility]   10/27/2022 11:15 AM Wm Coronado DPT [facility]   11/1/2022  1:30 PM Roselyn Kenney PT [facility]   11/3/2022  1:30 PM Roselyn Kenney PT [facility]   3/14/2023 11:00 AM Refugio Courtney MD [facility] BS AMB

## 2022-10-20 ENCOUNTER — HOSPITAL ENCOUNTER (OUTPATIENT)
Dept: PHYSICAL THERAPY | Age: 77
Discharge: HOME | End: 2022-10-20
Payer: MEDICARE

## 2022-10-20 PROCEDURE — 97110 THERAPEUTIC EXERCISES: CPT

## 2022-10-20 PROCEDURE — 97530 THERAPEUTIC ACTIVITIES: CPT

## 2022-10-20 PROCEDURE — 97112 NEUROMUSCULAR REEDUCATION: CPT

## 2022-10-20 NOTE — PROGRESS NOTES
PT DAILY TREATMENT NOTE     Patient Name: Karely Ortega  Date:10/20/2022  : 1945  [x]  Patient  Verified  Payor: [place] MEDICARE / Plan: [place] MEDICARE PART A & B / Product Type: Medicare /    In time:134 pm   Out time:220 pm   Total Treatment Time (min): 46  Visit #: 3 of 8    Medicare/BCBS Only   Total Timed Codes (min):  46 1:1 Treatment Time:  46       Treatment Area: Other abnormalities of gait and mobility [R26.89]  Imbalance [R26.89]    SUBJECTIVE  Pain Level (0-10 scale): 0/10   Any medication changes, allergies to medications, adverse drug reactions, diagnosis change, or new procedure performed?: [x] No    [] Yes (see summary sheet for update)  Subjective functional status/changes:   [] No changes reported  Patient reports feeling good and denies any pain today. OBJECTIVE    22 min Therapeutic Exercise:  [] See flow sheet :   Rationale: increase ROM and increase strength to improve the patients overall muscle endurance and activity tolerance for ADL's and functional tasks. 10 min Therapeutic Activity:  []  See flow sheet :   Rationale: increase strength and improve coordination  to improve the patients ability to safely perform dynamic activities and to improve functional performance of  ADL's. 14 min Neuromuscular Re-education:  []  See flow sheet :   Rationale: increase strength, improve coordination, and improve balance  to improve the patients ability to perform activities with good form, stability and proprioception. With   [] TE   [] TA   [] neuro   [] other: Patient Education: [x] Review HEP    [] Progressed/Changed HEP based on:   [] positioning   [] body mechanics   [] transfers   [] heat/ice application    [] other:      Other Objective/Functional Measures:      Pain Level (0-10 scale) post treatment: 0/10     ASSESSMENT/Changes in Function:   Patient is progressing well towards goals.  Patient performed exercises, as per flow sheet, to assist with increasing right LE strength. Patient tolerated exercises well. Patient had increased difficulty bending left prothesis with standing marches and sit to stand. Verbal cues were required for proper hand placement for safety with sit to stands. Minimal assistance was required with left LE SLR. No reports of increased pain was reported at the end of today's session. Will continue to progress patient as tolerated and able. Patient will continue to benefit from skilled PT services to modify and progress therapeutic interventions, address functional mobility deficits, address ROM deficits, address strength deficits, analyze and address soft tissue restrictions, analyze and cue movement patterns, analyze and modify body mechanics/ergonomics, assess and modify postural abnormalities, and instruct in home and community integration to attain remaining goals. []  See Plan of Care  []  See progress note/recertification  []  See Discharge Summary         Progress towards goals / Updated goals:  Short Term Goals: To be accomplished in 4 treatments:              1 patient will have established and be I with HEP to aid with self management at discharge              EVAL issued HEP  CURRENT reports compliance with exercises at home daily 10/18/22              2 patient will tolerate RW ambulation 190'X2 for carryover to community ambulation and at home activities              EVAL SW and RW 40-50' S  CURRENT SW 40-60', SBA/S, right SC and SBQC 25' each with left HHA  10/18/22  Long Term Goals:  To be accomplished in 8 treatments:              1 patient will ambulate with right ' SBA for carryover to at home and community activities              EVAL 40-50' SW and RW S  CURRENT SW 40-60', SBA/S, right SC and SBQC 25' each with left HHA  10/18/22              2 patient will have FOTO improved to projected gains of 61 to show significant gains with activities and ADLS at home and in the communtiy              EVAL 34  CURRENT ongoing NA 10/20/22              3 patient will negotiate 6' stairs 2x step to pattern with cane and rail for carryover to home and community              EVAL reports UA  CURRENT ongoing NA 10/20/22    PLAN  [x]  Upgrade activities as tolerated     [x]  Continue plan of care  []  Update interventions per flow sheet       []  Discharge due to:_  []  Other:_      Olga Lidia Isatugracy, PTA 10/20/2022  2:01 PM    Future Appointments   Date Time Provider Karan Do   10/25/2022  1:30 PM Nicole Morales, PT [facility]   10/27/2022 11:15 AM Betzaida Titus DPT [facility]   11/1/2022  1:30 PM Haley Martin, PT [facility]   11/3/2022  1:30 PM Haley Martin, PT [facility]   3/14/2023 11:00 AM Lorna Negrete MD [facility] AM[facility]

## 2022-10-25 ENCOUNTER — HOSPITAL ENCOUNTER (OUTPATIENT)
Dept: PHYSICAL THERAPY | Age: 77
Discharge: HOME | End: 2022-10-25
Payer: MEDICARE

## 2022-10-25 PROCEDURE — 97530 THERAPEUTIC ACTIVITIES: CPT | Performed by: PHYSICAL THERAPIST

## 2022-10-25 PROCEDURE — 97112 NEUROMUSCULAR REEDUCATION: CPT | Performed by: PHYSICAL THERAPIST

## 2022-10-25 PROCEDURE — 97110 THERAPEUTIC EXERCISES: CPT | Performed by: PHYSICAL THERAPIST

## 2022-10-25 PROCEDURE — 97116 GAIT TRAINING THERAPY: CPT | Performed by: PHYSICAL THERAPIST

## 2022-10-25 NOTE — PROGRESS NOTES
PT DAILY TREATMENT NOTE     Patient Name: Belen Mason  Date:10/25/2022  : 1945  [x]  Patient  Verified  Payor: VA MEDICARE / Plan: [place] MEDICARE PART A & B / Product Type: Medicare /    In time:1:34P  Out time:2:30P  Total Treatment Time (min): 56in  Visit #: 4 of 8    Medicare/BCBS Only   Total Timed Codes (min):  56 1:1 Treatment Time:  53       Treatment Area: Other abnormalities of gait and mobility [R26.89]  Imbalance [R26.89]    SUBJECTIVE  Pain Level (0-10 scale): 0/10  Any medication changes, allergies to medications, adverse drug reactions, diagnosis change, or new procedure performed?: [x] No    [] Yes (see summary sheet for update)  Subjective functional status/changes:   [] No changes reported  Patient enters with front wheeled rolling walker that he states he acquired from his mother in law. However still very unsure how to use it. Also states his Left knee prosthesis continues to be too tight due to his inability to bend it during swing through phase of gait. OBJECTIVE    15 min Therapeutic Exercise:  [x] See flow sheet :   Rationale: increase ROM and increase strength to improve the patients ability to Tolerate basic ADLs and job-related tasks without pain. 10 min Therapeutic Activity:  [x]  See flow sheet :   Rationale: increase strength and improve coordination  to improve the patients ability to perform functional tasks such as overhead reach. 13 min Neuromuscular Re-education:  [x]  See flow sheet :   Rationale: improve coordination, improve balance, and increase proprioception  to improve the patients ability to perform activities with good form and proprioception with tactile and verbal cuing appropriately. 18 min Gait Training:  _1x350 and 3x50__ feet with  FWW_ device on level surfaces with _SBA__ level of assist   Rationale: also included stair training, to increase use and understanding of Left knee flexion during functional activities.      With   [x] TE [x] TA   [x] neuro   [] other: Patient Education: [x] Review HEP    [x] Progressed/Changed HEP based on:   [x] positioning   [] body mechanics   [] transfers   [] heat/ice application    [] other:      Other Objective/Functional Measures: able to stair climb 4# with step to pattern. Pain Level (0-10 scale) post treatment: 0/10    ASSESSMENT/Changes in Function: Patient is progressing slowly. Able to increase gait speed and improve gait pattern with rolling walker this visit, however still seems to struggle with weight bearing through the Left knee prosthesis to produce the right about of pressure to ellicit the required flexion. Encouraged him to contact the prosthetic company again. Patient will continue to benefit from skilled PT services to modify and progress therapeutic interventions, address functional mobility deficits, address ROM deficits, address strength deficits, analyze and address soft tissue restrictions, analyze and cue movement patterns, analyze and modify body mechanics/ergonomics, assess and modify postural abnormalities, address imbalance/dizziness, and instruct in home and community integration to attain remaining goals. [x]  See Plan of Care  []  See progress note/recertification  []  See Discharge Summary         Progress towards goals / Updated goals:  Short Term Goals: To be accomplished in 4 treatments:              1 patient will have established and be I with HEP to aid with self management at discharge              EVAL issued HEP  CURRENT reports compliance with exercises at home daily 10/25/22              2 patient will tolerate RW ambulation 190'X2 for carryover to community ambulation and at home activities              EVAL SW and RW 40-50' S  CURRENT RW for 150ft 10/25/22  Long Term Goals:  To be accomplished in 8 treatments:              1 patient will ambulate with right ' SBA for carryover to at home and community activities              EVAL 40-50' SW and RW S  CURRENT SW 40-60', SBA/S, right SC and SBQC 25' each with left HHA  10/18/22              2 patient will have FOTO improved to projected gains of 61 to show significant gains with activities and ADLS at home and in the communtiy              EVAL 34  CURRENT ongoing NA 10/20/22              3 patient will negotiate 6' stairs 2x step to pattern with cane and rail for carryover to home and community              EVAL reports UA  CURRENT ongoing NA 10/20/22    PLAN  [x]  Upgrade activities as tolerated     []  Continue plan of care  []  Update interventions per flow sheet       []  Discharge due to:_  []  Other:_      Franca Ennis, PT 10/25/2022  2:36 PM    Future Appointments   Date Time Provider Karan Do   10/27/2022 11:15 AM Sultana Hart DPT [facility]   11/1/2022  1:30 PM Iva Espinal PT [facility]   11/3/2022  1:30 PM Iva Espinal PT [facility]   3/14/2023 11:00 AM Niki Espinal, Pat Singletary MD [facility] BS AMB

## 2022-10-27 ENCOUNTER — HOSPITAL ENCOUNTER (OUTPATIENT)
Dept: PHYSICAL THERAPY | Age: 77
Discharge: HOME | End: 2022-10-27
Payer: MEDICARE

## 2022-10-27 PROCEDURE — 97110 THERAPEUTIC EXERCISES: CPT

## 2022-10-27 PROCEDURE — 97530 THERAPEUTIC ACTIVITIES: CPT

## 2022-10-27 PROCEDURE — 97112 NEUROMUSCULAR REEDUCATION: CPT

## 2022-10-27 PROCEDURE — 97535 SELF CARE MNGMENT TRAINING: CPT

## 2022-10-27 PROCEDURE — 97116 GAIT TRAINING THERAPY: CPT

## 2022-10-27 NOTE — PROGRESS NOTES
PT DAILY TREATMENT NOTE     Patient Name: Chencho Harrell  Date:10/27/2022  : 1945  [x]  Patient  Verified  Payor: [place] MED[place]ARE / Plan: [place] MEDICARE PART A & B / Product Type: Medicare /    In time:11:23A  Out time: 12:32P  Total Treatment Time (min): 69   Visit #: 5 of 8    Medicare/BCBS Only   Total Timed Codes (min):  69 1:1 Treatment Time:  69       Treatment Area: Other abnormalities of gait and mobility [R26.89]  Imbalance [R26.89]    SUBJECTIVE  Pain Level (0-10 scale): 0   Any medication changes, allergies to medications, adverse drug reactions, diagnosis change, or new procedure performed?: [x] No    [] Yes (see summary sheet for update)  Subjective functional status/changes:   [] No changes reported  Pt reports doing well w/ no pain, however continues to feel left prosthesis is not fitting correctly w/ inability to bend while walking.  States daughter is making appt with prosthetist. Also, states \"I haven't gotten the tennis balls yet for this walker, it's makes a lot of noise right now\"       OBJECTIVE    19 min Therapeutic Exercise:  [x] See flow sheet :   Rationale: increase ROM and increase strength to improve the patients ability to perform ADLs with greater ease    11 min Therapeutic Activity:  [x]  See flow sheet :   Rationale: increase ROM, increase strength, and improve coordination  to improve the patients ability to perform functional tasks with greater ease and reduced fall risk     19 min Neuromuscular Re-education:  [x]  See flow sheet :   Rationale: improve coordination, improve balance, and increase proprioception  to improve the patients ability to perform activities with reduced fall risk    10 min Gait Trainin feet x4 with FWW device on level surfaces with CGA-SBA level of assist; verbal and visual cuing for optimal sequencing, staying within AD, keeping AD in contact with floor and to optimize gait pattern   Rationale: increase awareness, confidence and stability to improve safety and independence with ambulation    10 min Self Care/Home Management: Pt ed (in addition to pt daughter ed) on relevant anatomy/patho, activity/positional modifications, AD use w/ resources to implement to allow for smoother glide across floor, importance of prosthetic appointment to address concerns of fit/placement, POC moving fwd, HEP performance - in order to increase safety, ind and ease performing ADL/IADLs an - pt, and pt's daughter, demonstrates knowledge and understanding of this    Rationale:  increase pt ed/awareness  to improve the patients ability to safely and effectively perform ADLs and continue progression toward LTGs    With   [x] TE   [x] TA   [x] neuro   [x] other: GT/self care management Patient Education: [x] Review HEP    [x] Progressed/Changed HEP based on:   [x] positioning   [] body mechanics   [] transfers   [] heat/ice application    [] other:      Other Objective/Functional Measures:   Gait: Circumduction of left limb during swing phase w/ absent knee flex during left LE stance phase; FWW, frequently picking walker up from floor    Pain Level (0-10 scale) post treatment: 0/10    ASSESSMENT/Changes in Function:   Patient w/ demonstration of ability to bend prosthetic knee in open chain and closed chain activity performance, w/ repetitive ex emphasizing movement pattern for carryover effects during gait. No therapeutic carryover demonstrated after, as it appears pt is hesitant to allow weight acceptance for left prosthetic knee flex to occur out of fear of prosthetic coming loose and falling off (verbally reporting this). Encouraged follow up with prosthetist, in addition to extensive pt ed (see above). Upon concluding session, pt's daughter informs pt and therapist that earliest available appointment with prosthetist was 11/22/2022, in which pt is now scheduled for.      Patient will continue to benefit from skilled PT services to modify and progress therapeutic interventions, address functional mobility deficits, address ROM deficits, address strength deficits, analyze and address soft tissue restrictions, analyze and cue movement patterns, analyze and modify body mechanics/ergonomics, assess and modify postural abnormalities, address imbalance/dizziness, and instruct in home and community integration to attain remaining goals. [x]  See Plan of Care  []  See progress note/recertification  []  See Discharge Summary         Progress towards goals / Updated goals:  Short Term Goals: To be accomplished in 4 treatments:              1 patient will have established and be I with HEP to aid with self management at discharge              EVAL issued HEP  CURRENT reports compliance with exercises at home daily 10/25/22              2 patient will tolerate RW ambulation 190'X2 for carryover to community ambulation and at home activities              EVAL SW and RW 40-50' S  CURRENT RW for 150ft 10/25/22  Long Term Goals:  To be accomplished in 8 treatments:              1 patient will ambulate with right ' SBA for carryover to at home and community activities              EVAL 40-50' SW and RW S  CURRENT SW 40-60', SBA/S, right SC and SBQC 25' each with left HHA  10/18/22              2 patient will have FOTO improved to projected gains of 61 to show significant gains with activities and ADLS at home and in the communtiy              EVAL 34  CURRENT ongoing NA 10/27/22              3 patient will negotiate 6' stairs 2x step to pattern with cane and rail for carryover to home and community              EVAL reports UA  CURRENT ongoing NA 10/20/22    PLAN  [x]  Upgrade activities as tolerated     [x]  Continue plan of care  [x]  Update interventions per flow sheet       []  Discharge due to:_  []  Other:_      Laxmi Rodriguez DPT 10/27/2022  2:36 PM    Future Appointments   Date Time Provider Karan Do   11/1/2022  1:30 PM [facility]   11/3/2022 1:30 PM Paz Cheng, PT [facility]   12/8/2022 11:20 AM Meeta Henry MD [facility] BS AMB   3/14/2023 11:00 AM Meeta Henry MD [facility] BS AMB

## 2022-11-01 ENCOUNTER — HOSPITAL ENCOUNTER (OUTPATIENT)
Dept: PHYSICAL THERAPY | Age: 77
Discharge: HOME | End: 2022-11-01
Payer: MEDICARE

## 2022-11-01 PROCEDURE — 97530 THERAPEUTIC ACTIVITIES: CPT

## 2022-11-01 PROCEDURE — 97112 NEUROMUSCULAR REEDUCATION: CPT

## 2022-11-01 PROCEDURE — 97110 THERAPEUTIC EXERCISES: CPT

## 2022-11-01 NOTE — PROGRESS NOTES
PT DAILY TREATMENT NOTE     Patient Name: Derek Hooper  Date:2022  : 1945  [x]  Patient  Verified  Payor: Rima Barrientos / Plan: [place] MEDICARE PART A & B / Product Type: Medicare /    In time:130  Out time:215  Total Treatment Time (min): 45  Visit #: 6 of 8    Medicare/BCBS Only   Total Timed Codes (min):  45 1:1 Treatment Time:  45       Treatment Area: Other abnormalities of gait and mobility [R26.89]  Imbalance [R26.89]    SUBJECTIVE  Pain Level (0-10 scale): 0  Any medication changes, allergies to medications, adverse drug reactions, diagnosis change, or new procedure performed?: [x] No    [] Yes (see summary sheet for update)  Subjective functional status/changes:   [] No changes reported  \"I am going to see Neeta Meraz here on the . It is already set up. \"    OBJECTIVE        15 min Therapeutic Exercise:  [x] See flow sheet :   Rationale: increase ROM, increase strength, improve coordination, improve balance, and increase proprioception to improve the patients ability to tolerate ADLS and activities    20 min Therapeutic Activity:  [x]  See flow sheet :   Rationale: increase ROM, increase strength, improve coordination, improve balance, and increase proprioception  to improve the patients ability to tolerate ADLS and activities  Ambulation with RW 61' 80' 25' with S and SBA with VC for technique with left AKA prosthesis  FOTO and goals check     10 min Neuromuscular Re-education:  [x]  See flow sheet :   Rationale: increase ROM, increase strength, improve coordination, improve balance, and increase proprioception  to improve the patients ability to tolerate ADLs and activities     Rationale:              With   [] TE   [] TA   [] neuro   [] other: Patient Education: [x] Review HEP    [] Progressed/Changed HEP based on:   [] positioning   [] body mechanics   [] transfers   [] heat/ice application    [] other:      Other Objective/Functional Measures: VC exercises and technique     Pain Level (0-10 scale) post treatment: 0    ASSESSMENT/Changes in Function: tolerated well. Patient notes continued complaints with left AKA prosthesis limiting his progress with gait and transition to SC/QC. (Socket loose and knee hinge too tight). He will have 11/3/22 session and then on hold until 11/22/22 with PT and prosthetist.     Patient will continue to benefit from skilled PT services to modify and progress therapeutic interventions, address functional mobility deficits, address ROM deficits, address strength deficits, analyze and address soft tissue restrictions, analyze and cue movement patterns, analyze and modify body mechanics/ergonomics, assess and modify postural abnormalities, address imbalance/dizziness, and instruct in home and community integration to attain remaining goals. [x]  See Plan of Care  [x]  See progress note/recertification  []  See Discharge Summary         Progress towards goals / Updated goals:   Short Term Goals: To be accomplished in 4 treatments:              1 patient will have established and be I with HEP to aid with self management at discharge              EVAL issued HEP  CURRENT reports compliance with exercises at home daily 11/1/22              2 patient will tolerate RW ambulation 190'X2 for carryover to community ambulation and at home activities              EVAL SW and RW 40-50' S  CURRENT RW for 150ft 11/1/22  Long Term Goals:  To be accomplished in 8 treatments:              1 patient will ambulate with right ' SBA for carryover to at home and community activities              EVAL 40-50' SW and RW S  CURRENT SW 40-60', SBA/S, right SC and SBQC 25' each with left HHA  10/18/22   60-80' RW S 11/1/22              2 patient will have FOTO improved to projected gains of 61 to show significant gains with activities and ADLS at home and in the communtiy              EVAL 34  CURRENT 46 11/1/22              3 patient will negotiate 6' stairs 2x step to pattern with cane and rail for carryover to home and community              EVAL reports UA  CURRENT ongoing NA 11/1/22    PLAN  [x]  Upgrade activities as tolerated     [x]  Continue plan of care  []  Update interventions per flow sheet       []  Discharge due to:_  [x]  Other:_  send recert , PT 39/4/08 and then 11/22/22 with prosthetist here for session per patient      Tarsha Ford, PT 11/1/2022  1:36 PM    Future Appointments   Date Time Provider Karan Do   11/3/2022  1:30 PM Sierra Quan PTA [facility]   11/22/2022  1:30 PM LILIAM MarieT [facility]   12/8/2022 11:20 AM Jeffrey Lind MD [facility] BS AMB   3/14/2023 11:00 AM Jeffrey Lind MD [facility] BS AMB

## 2022-11-01 NOTE — PROGRESS NOTES
In [address] [place] [address], [address] [place] [address], [facility] [address] [address]  [phone] fax      Continued Plan of Care/ Re-certification for Physical Therapy Services    Patient name: Rene Soriano Start of Care: 10/3/22   Referral source: Goldie Taylor MD : 1945   Medical/Treatment Diagnosis: Other abnormalities of gait and mobility [R26.89]  Imbalance [R26.89]  Payor: Anaid Counter / Plan: [place] MEDICARE PART A & B / Product Type: Medicare /  Onset Date:22     Prior Hospitalization: see medical history Provider#: [id number]   Medications: Verified on Patient Summary List    Comorbidities:  HTN, left AKA  due to poor circulation   Prior Level of Function:  prior to getting the first new socket in  was able to drive, cut his grass, load and unload power scooter by himself , no AD in the home, but used right SC for mobility, I all areas of ADLS and activities. Since receiving the new socket in  has been UA to use the cane or cut the grass, but still able to drive and use SW                              Visits from Start of Care: 6    Missed Visits: 0    The Plan of Care and following information is based on the patient's current status:   Short Term Goals: To be accomplished in 4 treatments:              1 patient will have established and be I with HEP to aid with self management at discharge              EVAL issued HEP  CURRENT reports compliance with exercises at home daily 22              2 patient will tolerate RW ambulation 190'X2 for carryover to community ambulation and at home activities              EVAL SW and RW 40-50' S  CURRENT RW for 150ft 22  Long Term Goals:  To be accomplished in 8 treatments:              1 patient will ambulate with right ' SBA for carryover to at home and community activities              EVAL 40-50' SW and RW S  CURRENT SW 40-60', SBA/S, right SC and SBQC 25' each with left HHA  10/18/22 2 patient will have FOTO improved to projected gains of 61 to show significant gains with activities and ADLS at home and in the communtiy              EVAL 34  CURRENT 46 11/1/22              3 patient will negotiate 6' stairs 2x step to pattern with cane and rail for carryover to home and community              EVAL reports UA  CURRENT ongoing NA 11/1/22    Key functional changes: increase tolerance to exercises      Problems/ barriers to goal attainment: poor fitting prosthesis per his reports     Problem List: decrease ROM, decrease strength, impaired gait/ balance, decrease ADL/ functional abilitiies, decrease activity tolerance, decrease flexibility/ joint mobility, decrease transfer abilities, and other FOTO 46    Treatment Plan: Therapeutic exercise, Neuromuscular reeducation, Therapeutic activity, Self care/home management, and Gait training     Patient Goal (s) has been updated and includes: get the prosthesis fitting better so I can walk with 1 cane     Goals for this certification period to be accomplished in 4 weeks:        1 patient will ambulate with right ' SBA for carryover to at home and community activities              PN SW 40-60', SBA/S, right SC and SBQC 25' each with left HHA  10/18/22 60-80' RW S 11/1/22              2 patient will have FOTO improved to projected gains of 61 to show significant gains with activities and ADLS at home and in the communtiy              PN 46 11/1/22              3 patient will negotiate 6' stairs 2x step to pattern with cane and rail for carryover to home and community          PN  ongoing NA 11/1/22    Frequency / Duration: Patient to be seen 2 times per week for 4 weeks:    Assessment / Recommendations: tolerated well. Patient notes continued complaints with left AKA prosthesis limiting his progress with gait and transition to SC/QC. (Socket loose and knee hinge too tight per his reports).  He will have 11/3/22 session and then requests to be on hold until 11/22/22 with that appointment with PT and prosthetist.      Patient will continue to benefit from skilled PT services to modify and progress therapeutic interventions, address functional mobility deficits, address ROM deficits, address strength deficits, analyze and address soft tissue restrictions, analyze and cue movement patterns, analyze and modify body mechanics/ergonomics, assess and modify postural abnormalities, address imbalance/dizziness, and instruct in home and community integration to attain remaining goals. Certification Period: 11/2/22 - 12/1/22    Anthony Bone PT 11/1/2022 1:40 PM    ________________________________________________________________________  I certify that the above Therapy Services are being furnished while the patient is under my care. I agree with the treatment plan and certify that this therapy is necessary. [] I have read the above and request that my patient continue as recommended.   [] I have read the above report and request that my patient continue therapy with the following changes/special instructions: _____________________________________________  [] I have read the above report and request that my patient be discharged from therapy    Physician's Signature:____________Date:_________TIME:________     Edinson Baker MD  ** Signature, Date and Time must be completed for valid certification **    Please sign and return to In [address] [place] [address] Square  [address] [place] [address], [address] [place] [address], [address] [place] [address],[facility] 300 [phone] fax

## 2022-11-03 ENCOUNTER — HOSPITAL ENCOUNTER (OUTPATIENT)
Dept: PHYSICAL THERAPY | Age: 77
Discharge: HOME | End: 2022-11-03
Payer: MEDICARE

## 2022-11-03 PROCEDURE — 97112 NEUROMUSCULAR REEDUCATION: CPT | Performed by: PHYSICAL THERAPIST

## 2022-11-03 PROCEDURE — 97116 GAIT TRAINING THERAPY: CPT | Performed by: PHYSICAL THERAPIST

## 2022-11-03 PROCEDURE — 97110 THERAPEUTIC EXERCISES: CPT | Performed by: PHYSICAL THERAPIST

## 2022-11-03 PROCEDURE — 97530 THERAPEUTIC ACTIVITIES: CPT | Performed by: PHYSICAL THERAPIST

## 2022-11-03 NOTE — PROGRESS NOTES
PT DAILY TREATMENT NOTE     Patient Name: Oniel Chadwick  Date:11/3/2022  : 1945  [x]  Patient  Verified  Payor: Teresa Grew / Plan: [place] MEDICARE PART A & B / Product Type: Medicare /    In time:1:18P  Out time:2:15P  Total Treatment Time (min): 57min  Visit #: 1 of 8    Medicare/BCBS Only   Total Timed Codes (min):  57 1:1 Treatment Time:  57       Treatment Area: Other abnormalities of gait and mobility [R26.89]  Imbalance [R26.89]    SUBJECTIVE  Pain Level (0-10 scale): 0/10  Any medication changes, allergies to medications, adverse drug reactions, diagnosis change, or new procedure performed?: [x] No    [] Yes (see summary sheet for update)  Subjective functional status/changes:   [] No changes reported  Patient states he still feels the knee is too tight preventing his us of the leg into a bend during functional activities. OBJECTIVE  15 min Therapeutic Exercise:  [x] See flow sheet :   Rationale: increase ROM and increase strength to improve the patients ability to Tolerate basic ADLs and job-related tasks without pain. 13 min Therapeutic Activity:  [x]  See flow sheet :   Rationale: increase strength and improve coordination  to improve the patients ability to perform functional tasks such as sit to stand transfers. 13 min Neuromuscular Re-education:  [x]  See flow sheet :   Rationale: improve coordination, improve balance, and increase proprioception  to improve the patients ability to perform activities with good form and proprioception with tactile and verbal cuing appropriately. 16 min Gait Training:  _1x350 and 3x50__ feet with  FWW_ device on level surfaces with _SBA__ level of assist   Rationale: also included stair training, to increase use and understanding of Left knee flexion during functional activities.      With   [x] TE   [x] TA   [x] neuro   [] other: Patient Education: [x] Review HEP    [x] Progressed/Changed HEP based on:   [x] positioning   [] body mechanics   [] transfers   [] heat/ice application    [] other:      Other Objective/Functional Measures: continues with step-to gait pattern with the rolling walker     Pain Level (0-10 scale) post treatment: 0/10    ASSESSMENT/Changes in Function: Patient is progressing towards goals of increased activity tolerance and decreased instability. Spent much of today's session focusing on the concept that the patient needs to CLE[facility] cierra\" in his left leg during transfers, gait, etc. For it to work properly at all even if it's not adjusted properly. Able to demonstrate improved weight shift to the Left with use of mirror for biofeedback, blocking Left foot, verbal and tactile cuing. Patient will continue to benefit from skilled PT services to modify and progress therapeutic interventions, address functional mobility deficits, address ROM deficits, address strength deficits, analyze and address soft tissue restrictions, analyze and cue movement patterns, analyze and modify body mechanics/ergonomics, assess and modify postural abnormalities, address imbalance/dizziness, and instruct in home and community integration to attain remaining goals.      [x]  See Plan of Care  []  See progress note/recertification  []  See Discharge Summary         Progress towards goals / Updated goals:   1 patient will ambulate with right ' SBA for carryover to at home and community activities              PN SW 40-60', SBA/S, right SC and SBQC 25' each with left HHA  10/18/22 60-80' RW S 11/1/22   Current: 80ft with RW and step to pattern, SBA - 11/3/22              2 patient will have FOTO improved to projected gains of 61 to show significant gains with activities and ADLS at home and in the communtiy              PN 46 11/1/22              3 patient will negotiate 6' stairs 2x step to pattern with cane and rail for carryover to home and community          PN  ongoing NA 11/1/22       PLAN  [x]  Upgrade activities as tolerated     [] Continue plan of care  []  Update interventions per flow sheet       []  Discharge due to:_  []  Other:_      Armin Stallworth, PT 11/3/2022  1:33 PM    Future Appointments   Date Time Provider Karan Do   11/22/2022  1:30 PM Michael Jordan DPT [facility]   12/8/2022 11:20 AM Kenny Trevino MD [facility] BS AMB   3/14/2023 11:00 AM Kenny Trevino MD [facility] BS AMB

## 2022-11-10 ENCOUNTER — APPOINTMENT (OUTPATIENT)
Dept: PHYSICAL THERAPY | Age: 77
End: 2022-11-10
Payer: MEDICARE

## 2022-11-22 ENCOUNTER — HOSPITAL ENCOUNTER (OUTPATIENT)
Dept: PHYSICAL THERAPY | Age: 77
Discharge: HOME | End: 2022-11-22
Payer: MEDICARE

## 2022-11-22 PROCEDURE — 97112 NEUROMUSCULAR REEDUCATION: CPT

## 2022-11-22 PROCEDURE — 97535 SELF CARE MNGMENT TRAINING: CPT

## 2022-11-22 PROCEDURE — 97116 GAIT TRAINING THERAPY: CPT

## 2022-11-23 NOTE — PROGRESS NOTES
PT DAILY TREATMENT NOTE     Patient Name: Roberta Bowens  Date:2022  : 1945  [x]  Patient  Verified  [place]or: [place] MED[place] / Plan: [place] MED[place]ARE PART A & B / Product Type: Medicare /    In time:11:23A  Out time: 12:32P  Total Treatment Time (min): 69   Visit #: 5 of 8    Medicare/BCBS Only   Total Timed Codes (min):  69 1:1 Treatment Time:  69       Treatment Area: Other abnormalities of gait and mobility [R26.89]  Imbalance [R26.89]    SUBJECTIVE  Pain Level (0-10 scale): 0   Any medication changes, allergies to medications, adverse drug reactions, diagnosis change, or new procedure performed?: [x] No    [] Yes (see summary sheet for update)  Subjective functional status/changes:   [] No changes reported  Pt reports doing well w/ no pain, however continues to feel left prosthesis is not fitting correctly w/ inability to bend while walking.  States daughter is making appt with prosthetist. Also, states \"I haven't gotten the tennis balls yet for this walker, it's makes a lot of noise right now\"       OBJECTIVE    19 min Therapeutic Exercise:  [x] See flow sheet :   Rationale: increase ROM and increase strength to improve the patients ability to perform ADLs with greater ease    11 min Therapeutic Activity:  [x]  See flow sheet :   Rationale: increase ROM, increase strength, and improve coordination  to improve the patients ability to perform functional tasks with greater ease and reduced fall risk     19 min Neuromuscular Re-education:  [x]  See flow sheet :   Rationale: improve coordination, improve balance, and increase proprioception  to improve the patients ability to perform activities with reduced fall risk    10 min Gait Trainin feet x4 with FWW device on level surfaces with CGA-SBA level of assist; verbal and visual cuing for optimal sequencing, staying within AD, keeping AD in contact with floor and to optimize gait pattern   Rationale: increase awareness, confidence and stability to improve safety and independence with ambulation    10 min Self Care/Home Management: Pt ed (in addition to pt daughter ed) on relevant anatomy/patho, activity/positional modifications, AD use w/ resources to implement to allow for smoother glide across floor, importance of prosthetic appointment to address concerns of fit/placement, POC moving fwd, HEP performance - in order to increase safety, ind and ease performing ADL/IADLs an - pt, and pt's daughter, demonstrates knowledge and understanding of this    Rationale:  increase pt ed/awareness  to improve the patients ability to safely and effectively perform ADLs and continue progression toward LTGs    With   [x] TE   [x] TA   [x] neuro   [x] other: GT/self care management Patient Education: [x] Review HEP    [x] Progressed/Changed HEP based on:   [x] positioning   [] body mechanics   [] transfers   [] heat/ice application    [] other:      Other Objective/Functional Measures:   Gait: Circumduction of left limb during swing phase w/ absent knee flex during left LE stance phase; FWW, frequently picking walker up from floor    Pain Level (0-10 scale) post treatment: 0/10    ASSESSMENT/Changes in Function:   Patient w/ demonstration of ability to bend prosthetic knee in open chain and closed chain activity performance, w/ repetitive ex emphasizing movement pattern for carryover effects during gait. No therapeutic carryover demonstrated after, as it appears pt is hesitant to allow weight acceptance for left prosthetic knee flex to occur out of fear of prosthetic coming loose and falling off (verbally reporting this). Encouraged follow up with prosthetist, in addition to extensive pt ed (see above). Upon concluding session, pt's daughter informs pt and therapist that earliest available appointment with prosthetist was 11/22/2022, in which pt is now scheduled for.      Patient will continue to benefit from skilled PT services to modify and progress therapeutic interventions, address functional mobility deficits, address ROM deficits, address strength deficits, analyze and address soft tissue restrictions, analyze and cue movement patterns, analyze and modify body mechanics/ergonomics, assess and modify postural abnormalities, address imbalance/dizziness, and instruct in home and community integration to attain remaining goals. [x]  See Plan of Care  []  See progress note/recertification  []  See Discharge Summary         Progress towards goals / Updated goals:  Short Term Goals: To be accomplished in 4 treatments:              1 patient will have established and be I with HEP to aid with self management at discharge              EVAL issued HEP  CURRENT reports compliance with exercises at home daily 10/25/22              2 patient will tolerate RW ambulation 190'X2 for carryover to community ambulation and at home activities              EVAL SW and RW 40-50' S  CURRENT RW for 150ft 10/25/22  Long Term Goals:  To be accomplished in 8 treatments:              1 patient will ambulate with right ' SBA for carryover to at home and community activities              EVAL 40-50' SW and RW S  CURRENT SW 40-60', SBA/S, right SC and SBQC 25' each with left HHA  10/18/22              2 patient will have FOTO improved to projected gains of 61 to show significant gains with activities and ADLS at home and in the communtiy              EVAL 34  CURRENT ongoing NA 10/27/22              3 patient will negotiate 6' stairs 2x step to pattern with cane and rail for carryover to home and community              EVAL reports UA  CURRENT ongoing NA 10/20/22    PLAN  [x]  Upgrade activities as tolerated     [x]  Continue plan of care  [x]  Update interventions per flow sheet       []  Discharge due to:_  []  Other:_      Juan Laguna DPT 11/23/2022  2:36 PM    Future Appointments   Date Time Provider Karan Do   12/8/2022 11:20 AM Ivonne Cuenca MD [facility] BS AMB 3/14/2023 11:00 AM Ivonne Cuenca MD [facility] BS AMB

## 2022-12-08 ENCOUNTER — OFFICE VISIT (OUTPATIENT)
Dept: FAMILY MEDICINE CLINIC | Age: 77
End: 2022-12-08
Payer: MEDICARE

## 2022-12-08 VITALS
BODY MASS INDEX: 26.89 KG/M2 | DIASTOLIC BLOOD PRESSURE: 73 MMHG | HEIGHT: 65 IN | HEART RATE: 71 BPM | OXYGEN SATURATION: 98 % | TEMPERATURE: 97.5 F | RESPIRATION RATE: 12 BRPM | SYSTOLIC BLOOD PRESSURE: 120 MMHG | WEIGHT: 161.4 LBS

## 2022-12-08 DIAGNOSIS — H40.89 OTHER GLAUCOMA OF LEFT EYE: ICD-10-CM

## 2022-12-08 DIAGNOSIS — E11.21 TYPE 2 DIABETES WITH NEPHROPATHY (HCC): ICD-10-CM

## 2022-12-08 DIAGNOSIS — I10 HTN (HYPERTENSION), BENIGN: Primary | ICD-10-CM

## 2022-12-08 DIAGNOSIS — Z01.818 PREOP EXAMINATION: ICD-10-CM

## 2022-12-08 PROCEDURE — G0463 HOSPITAL OUTPT CLINIC VISIT: HCPCS | Performed by: FAMILY MEDICINE

## 2022-12-08 RX ORDER — HYDROCHLOROTHIAZIDE 12.5 MG/1
CAPSULE ORAL
Qty: 90 CAPSULE | Refills: 3 | Status: SHIPPED | OUTPATIENT
Start: 2022-12-08

## 2022-12-08 NOTE — PROGRESS NOTES
1. \"Have you been to the ER, urgent care clinic since your last visit? Hospitalized since your last visit? \" No    2. \"Have you seen or consulted any other health care providers outside of the [address] [place] [address] since your last visit? \" No     3. For patients aged 39-70: Has the patient had a colonoscopy / FIT/ Cologuard?  NA - based on age

## 2022-12-08 NOTE — PROGRESS NOTES
Preoperative Evaluation    Date of Exam: 2022    Shira Adair is a 65 y.o. male (:1945) who presents for preoperative evaluation. Procedure/Surgery:  Date of Procedure/Surgery: tube shunt and patch graft with Mitomycin   Surgeon: Isma Cormier MD  Hospital/Surgical Facility:  [facility]  Primary Physician: Marycarmen Blanco MD  Latex Allergy: no    Pt has a h/o glaucoma; Has some blurred vision  He is scheduled for surgery as noted above    Problem List:     Patient Active Problem List    Diagnosis Date Noted    Type 2 diabetes with nephropathy ([facility] 2019    Advanced directives, counseling/discussion 12/15/2015    HTN (hypertension), benign 10/21/2010    Gout 2010    PVD (peripheral vascular disease) ([facility] 2010    DM (diabetes mellitus) ([facility] 2010    Hypercholesterolemia     Arthritis     Cataract      Medical History:     Past Medical History:   Diagnosis Date    Arthritis     hands    Cataract     left eye    Diabetes ([facility]     diet controlled    DM (diabetes mellitus) ([facility] 2/3/2010    HTN (hypertension), benign     Hypercholesterolemia     PVD (peripheral vascular disease) ([facility] 2/3/2010     Allergies:   No Known Allergies   Medications:     Current Outpatient Medications   Medication Sig    hydroCHLOROthiazide (MICROZIDE) 12.5 mg capsule TAKE 1 CAPSULE BY MOUTH DAILY    Rhopressa 0.02 % drop     dorzolamide (TRUSOPT) 2 % ophthalmic solution INSTILL 1 DROP INTO LEFT EYE TWICE A DAY    Lumigan 0.01 % ophthalmic drops INSTILL 1 DROP INTO LEFT EYE AT BEDTIME    valsartan (DIOVAN) 320 mg tablet TAKE 1 TABLET BY MOUTH EVERY DAY    prednisoLONE acetate (PRED FORTE) 1 % ophthalmic suspension     atropine 1 % ophthalmic solution     pravastatin (PRAVACHOL) 40 mg tablet TAKE 1 TABLET BY MOUTH EVERY DAY    MULTIVITAMINS (MULTI-VITAMIN PO) Take  by mouth. No current facility-administered medications for this visit.      Surgical History:     Past Surgical History:   Procedure Laterality Date    HX AMPUTATION      left leg aka prosthesis due to PVD    [facility] CARDIAC SURG PROCEDURE UNLIST  1995    left fem/pop bypass    [facility] CARDIAC SURG PROCEDURE UNLIST  1997    occlusion bypass angiographic thrombolysis failed    OK CARDIAC SURG PROCEDURE UNLIST      s/p left illiac/pop     Social History:     Social History     Socioeconomic History    Marital status:    Tobacco Use    Smoking status: Every Day     Packs/day: 0.50     Years: 59.00     Pack years: 29.50     Types: Cigarettes     Start date: 1961    Smokeless tobacco: Never   Vaping Use    Vaping Use: Never used   Substance and Sexual Activity    Alcohol use: No    Drug use: No    Sexual activity: Not Currently     Comment:      Social Determinants of Health     Financial Resource Strain: Low Risk     Difficulty of Paying Living Expenses: Not very hard   Food Insecurity: No Food Insecurity    Worried About Running Out of Food in the Last Year: Never true    Ran Out of Food in the Last Year: Never true       Recent use of: No recent use of aspirin (ASA), NSAIDS or steroids    Tetanus up to date: tetanus status unknown to the patient      Anesthesia Complications: None  History of abnormal bleeding : None  History of Blood Transfusions: no  Health Care Directive or Living Will: no    REVIEW OF SYSTEMS:  A comprehensive review of systems was negative except for that written in the HPI.     EXAM:   Visit Vitals  /73 (BP 1 Location: Right arm, BP Patient Position: Sitting, BP Cuff Size: Large adult)   Pulse 71   Temp 97.5 °F (36.4 °C) (Temporal)   Resp 12   Ht 5' 5\" (1.651 m)   Wt 161 lb 6.4 oz (73.2 kg)   SpO2 98%   BMI 26.86 kg/m²     General appearance - alert, well appearing, and in no distress  Ears - bilateral TM's and external ear canals normal  Lymphatics - no palpable lymphadenopathy, no hepatosplenomegaly  Chest - clear to auscultation, no wheezes, rales or rhonchi, symmetric air entry  Heart - normal rate, regular rhythm, normal S1, S2, no murmurs, rubs, clicks or gallops      IMPRESSION:   HTN  Glaucoma  DM  Preop      Pt stable    No contraindications to planned surgery    Follow-up and Dispositions    Return in about 5 months (around 5/8/2023) for htn.            Marco Sood MD   12/8/2022

## 2023-01-06 RX ORDER — PRAVASTATIN SODIUM 40 MG/1
TABLET ORAL
Qty: 90 TABLET | Refills: 3 | Status: SHIPPED | OUTPATIENT
Start: 2023-01-06

## 2023-03-14 ENCOUNTER — OFFICE VISIT (OUTPATIENT)
Age: 78
End: 2023-03-14
Payer: MEDICARE

## 2023-03-14 VITALS
OXYGEN SATURATION: 96 % | SYSTOLIC BLOOD PRESSURE: 130 MMHG | HEIGHT: 65 IN | RESPIRATION RATE: 16 BRPM | TEMPERATURE: 97.2 F | BODY MASS INDEX: 25.83 KG/M2 | HEART RATE: 57 BPM | DIASTOLIC BLOOD PRESSURE: 80 MMHG | WEIGHT: 155 LBS

## 2023-03-14 DIAGNOSIS — Z89.612 ACQUIRED ABSENCE OF LEFT LOWER EXTREMITY ABOVE KNEE (HCC): ICD-10-CM

## 2023-03-14 DIAGNOSIS — I73.9 PERIPHERAL VASCULAR DISEASE, UNSPECIFIED (HCC): ICD-10-CM

## 2023-03-14 DIAGNOSIS — E11.21 TYPE 2 DIABETES MELLITUS WITH DIABETIC NEPHROPATHY, WITHOUT LONG-TERM CURRENT USE OF INSULIN (HCC): Primary | ICD-10-CM

## 2023-03-14 LAB — HBA1C MFR BLD: 6.5 %

## 2023-03-14 PROCEDURE — 1123F ACP DISCUSS/DSCN MKR DOCD: CPT | Performed by: FAMILY MEDICINE

## 2023-03-14 PROCEDURE — 99214 OFFICE O/P EST MOD 30 MIN: CPT | Performed by: FAMILY MEDICINE

## 2023-03-14 PROCEDURE — PBSHW AMB POC HEMOGLOBIN A1C: Performed by: FAMILY MEDICINE

## 2023-03-14 PROCEDURE — G8484 FLU IMMUNIZE NO ADMIN: HCPCS | Performed by: FAMILY MEDICINE

## 2023-03-14 PROCEDURE — 3074F SYST BP LT 130 MM HG: CPT | Performed by: FAMILY MEDICINE

## 2023-03-14 PROCEDURE — 4004F PT TOBACCO SCREEN RCVD TLK: CPT | Performed by: FAMILY MEDICINE

## 2023-03-14 PROCEDURE — G8427 DOCREV CUR MEDS BY ELIG CLIN: HCPCS | Performed by: FAMILY MEDICINE

## 2023-03-14 PROCEDURE — 83036 HEMOGLOBIN GLYCOSYLATED A1C: CPT | Performed by: FAMILY MEDICINE

## 2023-03-14 PROCEDURE — 3078F DIAST BP <80 MM HG: CPT | Performed by: FAMILY MEDICINE

## 2023-03-14 PROCEDURE — G8417 CALC BMI ABV UP PARAM F/U: HCPCS | Performed by: FAMILY MEDICINE

## 2023-03-14 RX ORDER — VALSARTAN 320 MG/1
TABLET ORAL
Qty: 90 TABLET | Refills: 3 | Status: SHIPPED | OUTPATIENT
Start: 2023-03-14

## 2023-03-14 RX ORDER — HYDROCHLOROTHIAZIDE 12.5 MG/1
CAPSULE, GELATIN COATED ORAL
Qty: 90 CAPSULE | Refills: 3 | Status: SHIPPED | OUTPATIENT
Start: 2023-03-14

## 2023-03-14 RX ORDER — MULTIVITAMIN,THERAPEUTIC
TABLET ORAL
COMMUNITY

## 2023-03-14 SDOH — ECONOMIC STABILITY: HOUSING INSECURITY
IN THE LAST 12 MONTHS, WAS THERE A TIME WHEN YOU DID NOT HAVE A STEADY PLACE TO SLEEP OR SLEPT IN A SHELTER (INCLUDING NOW)?: NO

## 2023-03-14 SDOH — ECONOMIC STABILITY: FOOD INSECURITY: WITHIN THE PAST 12 MONTHS, YOU WORRIED THAT YOUR FOOD WOULD RUN OUT BEFORE YOU GOT MONEY TO BUY MORE.: NEVER TRUE

## 2023-03-14 SDOH — ECONOMIC STABILITY: INCOME INSECURITY: HOW HARD IS IT FOR YOU TO PAY FOR THE VERY BASICS LIKE FOOD, HOUSING, MEDICAL CARE, AND HEATING?: NOT HARD AT ALL

## 2023-03-14 SDOH — ECONOMIC STABILITY: FOOD INSECURITY: WITHIN THE PAST 12 MONTHS, THE FOOD YOU BOUGHT JUST DIDN'T LAST AND YOU DIDN'T HAVE MONEY TO GET MORE.: NEVER TRUE

## 2023-03-14 ASSESSMENT — PATIENT HEALTH QUESTIONNAIRE - PHQ9
SUM OF ALL RESPONSES TO PHQ QUESTIONS 1-9: 0
SUM OF ALL RESPONSES TO PHQ9 QUESTIONS 1 & 2: 0
2. FEELING DOWN, DEPRESSED OR HOPELESS: 0
SUM OF ALL RESPONSES TO PHQ QUESTIONS 1-9: 0
SUM OF ALL RESPONSES TO PHQ QUESTIONS 1-9: 0
1. LITTLE INTEREST OR PLEASURE IN DOING THINGS: 0
SUM OF ALL RESPONSES TO PHQ QUESTIONS 1-9: 0

## 2023-03-14 NOTE — PROGRESS NOTES
HPI:  Lauryn Steward is a 65 y.o. male who presents today with   Chief Complaint   Patient presents with    Hypertension    Blood Sugar Problem     6 month followup        HTN: BP is slightly elevated initially. He is feeling well;  needs a refill on BP meds. Pt has DM ; He feels well;  he has no new complaints. Has been under the care of his Eye MD.  Pt was seen in January. Patient has had a left aka    Hemoglobin A1C   Date Value Ref Range Status   09/14/2022 6.6 (H) 4.2 - 5.6 % Final     Comment:     (NOTE)  HbA1C Interpretive Ranges  <5.7              Normal  5.7 - 6.4         Consider Prediabetes  >6.5              Consider Diabetes                 No flowsheet data found. PMH,  Meds, Allergies, Family History, Social history reviewed      Current Outpatient Medications   Medication Sig Dispense Refill    Multiple Vitamin (THERA/BETA-CAROTENE) TABS Take by mouth      hydroCHLOROthiazide (MICROZIDE) 12.5 MG capsule TAKE 1 CAPSULE BY MOUTH DAILY      pravastatin (PRAVACHOL) 40 MG tablet TAKE 1 TABLET BY MOUTH EVERY DAY      atropine 1 % ophthalmic solution ceived the following from [facility]: Outside name: atropine 1 % ophthalmic solution      bimatoprost (LUMIGAN) 0.01 % SOLN ophthalmic drops INSTILL 1 DROP INTO LEFT EYE AT BEDTIME      dorzolamide (TRUSOPT) 2 % ophthalmic solution INSTILL 1 DROP INTO LEFT EYE TWICE A DAY      Netarsudil Dimesylate (RHOPRESSA) 0.02 % SOLN ceived the following from [facility]: Outside name: Rhopressa 0.02 % drop      prednisoLONE acetate (PRED FORTE) 1 % ophthalmic suspension ceived the following from [facility]: Outside name: prednisoLONE acetate (PRED FORTE) 1 % ophthalmic suspension      valsartan (DIOVAN) 320 MG tablet TAKE 1 TABLET BY MOUTH EVERY DAY       No current facility-administered medications for this visit.         No Known Allergies       Lab Results   Component Value Date    CHOL 143

## 2023-03-14 NOTE — PROGRESS NOTES
1. \"Have you been to the ER, urgent care clinic since your last visit? Hospitalized since your last visit? \" No    2. \"Have you seen or consulted any other health care providers outside of the [address] [place] [address] since your last visit? \" No     3. For patients aged 39-70: Has the patient had a colonoscopy / FIT/ Cologuard?  NA - based on age

## 2023-06-14 ENCOUNTER — OFFICE VISIT (OUTPATIENT)
Age: 78
End: 2023-06-14
Payer: MEDICARE

## 2023-06-14 VITALS
OXYGEN SATURATION: 96 % | WEIGHT: 146 LBS | BODY MASS INDEX: 24.32 KG/M2 | DIASTOLIC BLOOD PRESSURE: 71 MMHG | HEIGHT: 65 IN | HEART RATE: 89 BPM | TEMPERATURE: 98.1 F | RESPIRATION RATE: 16 BRPM | SYSTOLIC BLOOD PRESSURE: 132 MMHG

## 2023-06-14 DIAGNOSIS — H40.89 OTHER GLAUCOMA OF LEFT EYE: ICD-10-CM

## 2023-06-14 DIAGNOSIS — Z89.612 ACQUIRED ABSENCE OF LEFT LOWER EXTREMITY ABOVE KNEE (HCC): ICD-10-CM

## 2023-06-14 DIAGNOSIS — I10 ESSENTIAL (PRIMARY) HYPERTENSION: ICD-10-CM

## 2023-06-14 DIAGNOSIS — E11.21 TYPE 2 DIABETES MELLITUS WITH DIABETIC NEPHROPATHY, WITHOUT LONG-TERM CURRENT USE OF INSULIN (HCC): Primary | ICD-10-CM

## 2023-06-14 DIAGNOSIS — Z01.818 PREOP EXAMINATION: ICD-10-CM

## 2023-06-14 PROCEDURE — 1123F ACP DISCUSS/DSCN MKR DOCD: CPT | Performed by: FAMILY MEDICINE

## 2023-06-14 PROCEDURE — 3044F HG A1C LEVEL LT 7.0%: CPT | Performed by: FAMILY MEDICINE

## 2023-06-14 PROCEDURE — G8427 DOCREV CUR MEDS BY ELIG CLIN: HCPCS | Performed by: FAMILY MEDICINE

## 2023-06-14 PROCEDURE — 3074F SYST BP LT 130 MM HG: CPT | Performed by: FAMILY MEDICINE

## 2023-06-14 PROCEDURE — 99211 OFF/OP EST MAY X REQ PHY/QHP: CPT

## 2023-06-14 PROCEDURE — 4004F PT TOBACCO SCREEN RCVD TLK: CPT | Performed by: FAMILY MEDICINE

## 2023-06-14 PROCEDURE — G8420 CALC BMI NORM PARAMETERS: HCPCS | Performed by: FAMILY MEDICINE

## 2023-06-14 PROCEDURE — 99214 OFFICE O/P EST MOD 30 MIN: CPT | Performed by: FAMILY MEDICINE

## 2023-06-14 PROCEDURE — 3078F DIAST BP <80 MM HG: CPT | Performed by: FAMILY MEDICINE

## 2023-06-14 ASSESSMENT — PATIENT HEALTH QUESTIONNAIRE - PHQ9
SUM OF ALL RESPONSES TO PHQ9 QUESTIONS 1 & 2: 0
2. FEELING DOWN, DEPRESSED OR HOPELESS: 0
SUM OF ALL RESPONSES TO PHQ QUESTIONS 1-9: 0
1. LITTLE INTEREST OR PLEASURE IN DOING THINGS: 0

## 2023-09-27 ENCOUNTER — TELEPHONE (OUTPATIENT)
Age: 78
End: 2023-09-27

## 2023-09-27 NOTE — TELEPHONE ENCOUNTER
Patient's wife came in stating she needs to speak with provider in regards to patients mental state, states patient is not in he's right state of mind and and disabled also stated pt has become difficult to handle and she can't do it anymore, Advised wife she will need to get POA to be able to speak on behalf of the patient. Wife stated that she knows due to her having to do the same thing with her mom. Also advised wife she will need paperwork during pt's next appointment.

## 2023-10-18 ENCOUNTER — OFFICE VISIT (OUTPATIENT)
Age: 78
End: 2023-10-18
Payer: MEDICARE

## 2023-10-18 VITALS
TEMPERATURE: 98.1 F | SYSTOLIC BLOOD PRESSURE: 131 MMHG | DIASTOLIC BLOOD PRESSURE: 60 MMHG | HEIGHT: 65 IN | RESPIRATION RATE: 18 BRPM | OXYGEN SATURATION: 95 % | HEART RATE: 78 BPM | BODY MASS INDEX: 24.3 KG/M2

## 2023-10-18 DIAGNOSIS — Z00.00 MEDICARE ANNUAL WELLNESS VISIT, SUBSEQUENT: Primary | ICD-10-CM

## 2023-10-18 DIAGNOSIS — E11.21 TYPE 2 DIABETES MELLITUS WITH DIABETIC NEPHROPATHY, WITHOUT LONG-TERM CURRENT USE OF INSULIN (HCC): ICD-10-CM

## 2023-10-18 PROCEDURE — 3078F DIAST BP <80 MM HG: CPT | Performed by: FAMILY MEDICINE

## 2023-10-18 PROCEDURE — G0439 PPPS, SUBSEQ VISIT: HCPCS | Performed by: FAMILY MEDICINE

## 2023-10-18 PROCEDURE — G8484 FLU IMMUNIZE NO ADMIN: HCPCS | Performed by: FAMILY MEDICINE

## 2023-10-18 PROCEDURE — 3074F SYST BP LT 130 MM HG: CPT | Performed by: FAMILY MEDICINE

## 2023-10-18 PROCEDURE — 1123F ACP DISCUSS/DSCN MKR DOCD: CPT | Performed by: FAMILY MEDICINE

## 2023-10-18 PROCEDURE — 3044F HG A1C LEVEL LT 7.0%: CPT | Performed by: FAMILY MEDICINE

## 2023-10-18 RX ORDER — IRBESARTAN 300 MG/1
TABLET ORAL
COMMUNITY

## 2023-10-18 RX ORDER — PREDNISOLONE ACETATE 10 MG/ML
SUSPENSION/ DROPS OPHTHALMIC
COMMUNITY
Start: 2023-10-03

## 2023-10-18 ASSESSMENT — PATIENT HEALTH QUESTIONNAIRE - PHQ9
SUM OF ALL RESPONSES TO PHQ QUESTIONS 1-9: 0
1. LITTLE INTEREST OR PLEASURE IN DOING THINGS: 0
SUM OF ALL RESPONSES TO PHQ QUESTIONS 1-9: 0
SUM OF ALL RESPONSES TO PHQ QUESTIONS 1-9: 0
2. FEELING DOWN, DEPRESSED OR HOPELESS: 0
SUM OF ALL RESPONSES TO PHQ9 QUESTIONS 1 & 2: 0
SUM OF ALL RESPONSES TO PHQ QUESTIONS 1-9: 0

## 2023-10-18 ASSESSMENT — ANXIETY QUESTIONNAIRES
GAD7 TOTAL SCORE: 0
3. WORRYING TOO MUCH ABOUT DIFFERENT THINGS: 0
7. FEELING AFRAID AS IF SOMETHING AWFUL MIGHT HAPPEN: 0
2. NOT BEING ABLE TO STOP OR CONTROL WORRYING: 0
1. FEELING NERVOUS, ANXIOUS, OR ON EDGE: 0
6. BECOMING EASILY ANNOYED OR IRRITABLE: 0
IF YOU CHECKED OFF ANY PROBLEMS ON THIS QUESTIONNAIRE, HOW DIFFICULT HAVE THESE PROBLEMS MADE IT FOR YOU TO DO YOUR WORK, TAKE CARE OF THINGS AT HOME, OR GET ALONG WITH OTHER PEOPLE: NOT DIFFICULT AT ALL
5. BEING SO RESTLESS THAT IT IS HARD TO SIT STILL: 0
4. TROUBLE RELAXING: 0

## 2023-10-18 ASSESSMENT — LIFESTYLE VARIABLES
HOW OFTEN DO YOU HAVE A DRINK CONTAINING ALCOHOL: NEVER
HOW MANY STANDARD DRINKS CONTAINING ALCOHOL DO YOU HAVE ON A TYPICAL DAY: PATIENT DOES NOT DRINK

## 2023-10-18 NOTE — PROGRESS NOTES
1. \"Have you been to the ER, urgent care clinic since your last visit? Hospitalized since your last visit? \" No    2. \"Have you seen or consulted any other health care providers outside of the [address] [place] [address] since your last visit? \" No     3. For patients aged 43-73: Has the patient had a colonoscopy / FIT/ Cologuard?  NA - based on age

## 2023-10-20 ENCOUNTER — TELEPHONE (OUTPATIENT)
Age: 78
End: 2023-10-20

## 2023-10-20 DIAGNOSIS — S78.112A UNILATERAL AKA, LEFT (HCC): Primary | ICD-10-CM

## 2023-10-20 NOTE — TELEPHONE ENCOUNTER
Devon Hernandez from [facility] called requesting prescription for knee prosthesis states patient stated discuss with pcp during last appt in regards to prosthesis.      Phone Devon Hernandez [phone]  Fax number [phone]

## 2024-01-02 NOTE — TELEPHONE ENCOUNTER
Last Visit: 10-   Next Appointment: 02-  Previous Refill Encounter: 01- #90 tabs with 3 refills        Requested Prescriptions     Pending Prescriptions Disp Refills    pravastatin (PRAVACHOL) 40 MG tablet [Pharmacy Med Name: PRAVASTATIN SODIUM 40 MG TAB] 90 tablet 3     Sig: TAKE 1 TABLET BY MOUTH EVERY DAY **NEED UPDATED INSURANCE

## 2024-01-10 RX ORDER — PRAVASTATIN SODIUM 40 MG
TABLET ORAL
Qty: 90 TABLET | Refills: 3 | Status: SHIPPED | OUTPATIENT
Start: 2024-01-10

## 2024-02-20 ENCOUNTER — OFFICE VISIT (OUTPATIENT)
Age: 79
End: 2024-02-20
Payer: MEDICARE

## 2024-02-20 ENCOUNTER — HOSPITAL ENCOUNTER (OUTPATIENT)
Facility: HOSPITAL | Age: 79
Setting detail: SPECIMEN
Discharge: HOME | End: 2024-02-23
Payer: MEDICARE

## 2024-02-20 VITALS
WEIGHT: 159.4 LBS | DIASTOLIC BLOOD PRESSURE: 86 MMHG | TEMPERATURE: 97.7 F | SYSTOLIC BLOOD PRESSURE: 119 MMHG | RESPIRATION RATE: 16 BRPM | BODY MASS INDEX: 26.56 KG/M2 | OXYGEN SATURATION: 96 % | HEIGHT: 65 IN | HEART RATE: 87 BPM

## 2024-02-20 DIAGNOSIS — E11.21 TYPE 2 DIABETES MELLITUS WITH DIABETIC NEPHROPATHY, WITHOUT LONG-TERM CURRENT USE OF INSULIN (HCC): Primary | ICD-10-CM

## 2024-02-20 DIAGNOSIS — R09.89 BRUIT OF LEFT CAROTID ARTERY: ICD-10-CM

## 2024-02-20 DIAGNOSIS — I73.9 PERIPHERAL VASCULAR DISEASE, UNSPECIFIED (HCC): ICD-10-CM

## 2024-02-20 DIAGNOSIS — Z89.612 ACQUIRED ABSENCE OF LEFT LOWER EXTREMITY ABOVE KNEE (HCC): ICD-10-CM

## 2024-02-20 DIAGNOSIS — E11.21 TYPE 2 DIABETES MELLITUS WITH DIABETIC NEPHROPATHY, WITHOUT LONG-TERM CURRENT USE OF INSULIN (HCC): ICD-10-CM

## 2024-02-20 LAB
ALBUMIN SERPL-MCNC: 4.1 G/DL (ref 3.4–5)
ALBUMIN/GLOB SERPL: 1.4 (ref 0.8–1.7)
ALP SERPL-CCNC: 59 U/L (ref 45–117)
ALT SERPL-CCNC: 32 U/L (ref 16–61)
ANION GAP SERPL CALC-SCNC: 5 MMOL/L (ref 3–18)
AST SERPL-CCNC: 21 U/L (ref 10–38)
BILIRUB SERPL-MCNC: 1 MG/DL (ref 0.2–1)
BUN SERPL-MCNC: 20 MG/DL (ref 7–18)
BUN/CREAT SERPL: 19 (ref 12–20)
CALCIUM SERPL-MCNC: 10.2 MG/DL (ref 8.5–10.1)
CHLORIDE SERPL-SCNC: 108 MMOL/L (ref 100–111)
CHOLEST SERPL-MCNC: 136 MG/DL
CO2 SERPL-SCNC: 27 MMOL/L (ref 21–32)
CREAT SERPL-MCNC: 1.03 MG/DL (ref 0.6–1.3)
EST. AVERAGE GLUCOSE BLD GHB EST-MCNC: 137 MG/DL
GLOBULIN SER CALC-MCNC: 3 G/DL (ref 2–4)
GLUCOSE SERPL-MCNC: 130 MG/DL (ref 74–99)
HBA1C MFR BLD: 6.4 % (ref 4.2–5.6)
HDLC SERPL-MCNC: 46 MG/DL (ref 40–60)
HDLC SERPL: 3 (ref 0–5)
LDLC SERPL CALC-MCNC: 78.6 MG/DL (ref 0–100)
LIPID PANEL: NORMAL
POTASSIUM SERPL-SCNC: 4.7 MMOL/L (ref 3.5–5.5)
PROT SERPL-MCNC: 7.1 G/DL (ref 6.4–8.2)
SODIUM SERPL-SCNC: 140 MMOL/L (ref 136–145)
TRIGL SERPL-MCNC: 57 MG/DL
TSH SERPL DL<=0.05 MIU/L-ACNC: 1.05 UIU/ML (ref 0.36–3.74)
VLDLC SERPL CALC-MCNC: 11.4 MG/DL

## 2024-02-20 PROCEDURE — G8484 FLU IMMUNIZE NO ADMIN: HCPCS | Performed by: FAMILY MEDICINE

## 2024-02-20 PROCEDURE — 4004F PT TOBACCO SCREEN RCVD TLK: CPT | Performed by: FAMILY MEDICINE

## 2024-02-20 PROCEDURE — 83036 HEMOGLOBIN GLYCOSYLATED A1C: CPT

## 2024-02-20 PROCEDURE — 84443 ASSAY THYROID STIM HORMONE: CPT

## 2024-02-20 PROCEDURE — 3074F SYST BP LT 130 MM HG: CPT | Performed by: FAMILY MEDICINE

## 2024-02-20 PROCEDURE — G8417 CALC BMI ABV UP PARAM F/U: HCPCS | Performed by: FAMILY MEDICINE

## 2024-02-20 PROCEDURE — 36415 COLL VENOUS BLD VENIPUNCTURE: CPT

## 2024-02-20 PROCEDURE — 80061 LIPID PANEL: CPT

## 2024-02-20 PROCEDURE — 99214 OFFICE O/P EST MOD 30 MIN: CPT | Performed by: FAMILY MEDICINE

## 2024-02-20 PROCEDURE — 3079F DIAST BP 80-89 MM HG: CPT | Performed by: FAMILY MEDICINE

## 2024-02-20 PROCEDURE — G8427 DOCREV CUR MEDS BY ELIG CLIN: HCPCS | Performed by: FAMILY MEDICINE

## 2024-02-20 PROCEDURE — 80053 COMPREHEN METABOLIC PANEL: CPT

## 2024-02-20 PROCEDURE — 1123F ACP DISCUSS/DSCN MKR DOCD: CPT | Performed by: FAMILY MEDICINE

## 2024-02-20 RX ORDER — VALSARTAN 320 MG/1
TABLET ORAL
Qty: 90 TABLET | Refills: 3 | Status: SHIPPED | OUTPATIENT
Start: 2024-02-20

## 2024-02-20 RX ORDER — HYDROCHLOROTHIAZIDE 12.5 MG/1
CAPSULE, GELATIN COATED ORAL
Qty: 90 CAPSULE | Refills: 3 | Status: SHIPPED | OUTPATIENT
Start: 2024-02-20

## 2024-02-20 NOTE — PROGRESS NOTES
1. \"Have you been to the ER, urgent care clinic since your last visit?  Hospitalized since your last visit?\" No    2. \"Have you seen or consulted any other health care providers outside of the [facility] since your last visit?\" [facility]      3. For patients aged 45-75: Has the patient had a colonoscopy / FIT/ Cologuard? NA - based on age    
htn.  This has been fully explained to the patient, who indicates understanding.  AVS is accessible thru mychart and pt has been advised of same.             Kendal Lema MD